# Patient Record
Sex: MALE | Race: WHITE
[De-identification: names, ages, dates, MRNs, and addresses within clinical notes are randomized per-mention and may not be internally consistent; named-entity substitution may affect disease eponyms.]

---

## 2020-02-06 ENCOUNTER — RX ONLY (OUTPATIENT)
Age: 60
Setting detail: RX ONLY
End: 2020-02-06

## 2020-02-06 ENCOUNTER — APPOINTMENT (RX ONLY)
Dept: URBAN - METROPOLITAN AREA CLINIC 32 | Facility: CLINIC | Age: 60
Setting detail: DERMATOLOGY
End: 2020-02-06

## 2020-02-06 VITALS — DIASTOLIC BLOOD PRESSURE: 64 MMHG | SYSTOLIC BLOOD PRESSURE: 112 MMHG

## 2020-02-06 PROBLEM — C44.41 BASAL CELL CARCINOMA OF SKIN OF SCALP AND NECK: Status: ACTIVE | Noted: 2020-02-06

## 2020-02-06 PROCEDURE — 13121 CMPLX RPR S/A/L 2.6-7.5 CM: CPT

## 2020-02-06 PROCEDURE — ? MOHS SURGERY

## 2020-02-06 PROCEDURE — 17311 MOHS 1 STAGE H/N/HF/G: CPT

## 2020-02-06 RX ORDER — MUPIROCIN 20 MG/G
OINTMENT TOPICAL
Qty: 1 | Refills: 6 | Status: ERX | COMMUNITY
Start: 2020-02-06

## 2020-02-06 NOTE — PROCEDURE: MOHS SURGERY
Show Mohs Rapid Report Variable In The Stage Tabs (Ensure You Have This How You Like Before You Hide): Yes
Manual Repair Warning Statement: We plan on removing the manually selected variable below in favor of our much easier automatic structured text blocks found in the previous tab. We decided to do this to help make the flow better and give you the full power of structured data. Manual selection is never going to be ideal in our platform and I would encourage you to avoid using manual selection from this point on, especially since I will be sunsetting this feature. It is important that you do one of two things with the customized text below. First, you can save all of the text in a word file so you can have it for future reference. Second, transfer the text to the appropriate area in the Library tab. Lastly, if there is a flap or graft type which we do not have you need to let us know right away so I can add it in before the variable is hidden. No need to panic, we plan to give you roughly 6 months to make the change.
Same Histology In Subsequent Stages Text: The pattern and morphology of the tumor is as described in the first stage.
Quadrant Reporting?: no
Xenograft Text: The defect edges were debeveled with a #15 scalpel blade.  Given the location of the defect, shape of the defect and the proximity to free margins a xenograft was deemed most appropriate.  The graft was then trimmed to fit the size of the defect.  The graft was then placed in the primary defect and oriented appropriately.
Scc Moderately Differentiated Histology Text: Sections show moderately-differentiated keratincoytes with glassy eosinophilic cytoplasm, invading the dermis.
Double O-Z Flap Text: The defect edges were debeveled with a #15 scalpel blade.  Given the location of the defect, shape of the defect and the proximity to free margins a Double O-Z flap was deemed most appropriate.  Using a sterile surgical marker, an appropriate transposition flap was drawn incorporating the defect and placing the expected incisions within the relaxed skin tension lines where possible. The area thus outlined was incised deep to adipose tissue with a #15 scalpel blade.  The skin margins were undermined to an appropriate distance in all directions utilizing iris scissors.
Post-Care Instructions: I reviewed with the patient in detail post-care instructions. Patient is not to engage in any heavy lifting, exercise, or swimming for the next 14 days. Should the patient develop any fevers, chills, bleeding, severe pain patient will contact the office immediately.
Where Do You Want The Question To Include Opioid Counseling Located?: Case Summary Tab
Referred To Asc For Closure Text (Leave Blank If You Do Not Want): After obtaining clear surgical margins the patient was sent to an ASC for surgical repair.  The patient understands they will receive post-surgical care and follow-up from the ASC physician.
Partial Purse String (Intermediate) Text: Given the location of the defect and the characteristics of the surrounding skin an intermediate purse string closure was deemed most appropriate.  Undermining was performed circumfirentially around the surgical defect.  A purse string suture was then placed and tightened. Wound tension only allowed a partial closure of the circular defect.
Retention Suture Text: Retention sutures were placed to support the closure and prevent dehiscence.
Consent 2/Introductory Paragraph: Mohs surgery was explained to the patient and consent was obtained. The risks, benefits and alternatives to therapy were discussed in detail. Specifically, the risks of infection, scarring, bleeding, prolonged wound healing, incomplete removal, allergy to anesthesia, nerve injury and recurrence were addressed. Prior to the procedure, the treatment site was clearly identified and confirmed by the patient. All components of Universal Protocol/PAUSE Rule completed.
Mauc Override (Will Disregard Factors Selected In Indications Tab): 9 (Appropriate)
Closure 3 Information: This tab is for additional flaps and grafts above and beyond our usual structured repairs.  Please note if you enter information here it will not currently bill and you will need to add the billing information manually.
Spiral Flap Text: The defect edges were debeveled with a #15 scalpel blade.  Given the location of the defect, shape of the defect and the proximity to free margins a spiral flap was deemed most appropriate.  Using a sterile surgical marker, an appropriate rotation flap was drawn incorporating the defect and placing the expected incisions within the relaxed skin tension lines where possible. The area thus outlined was incised deep to adipose tissue with a #15 scalpel blade.  The skin margins were undermined to an appropriate distance in all directions utilizing iris scissors.
Information: Selecting Yes will display possible errors in your note based on the variables you have selected. This validation is only offered as a suggestion for you. PLEASE NOTE THAT THE VALIDATION TEXT WILL BE REMOVED WHEN YOU FINALIZE YOUR NOTE. IF YOU WANT TO FAX A PRELIMINARY NOTE YOU WILL NEED TO TOGGLE THIS TO 'NO' IF YOU DO NOT WANT IT IN YOUR FAXED NOTE.
Stage 15: Additional Anesthesia Type: 1% lidocaine with epinephrine
Secondary Intention Text (Leave Blank If You Do Not Want): The defect will heal with secondary intention.
Scc In Situ Histology Text: SQUAMOUS CELL CARCINOMA IN SITU(SCCIS TYPE 1): Within the epidermis is a full-thickness prolifiration of atypical keratoinocytes with mitoses.The overlying stratum corneum demonstrates parakeratosis. No invasion is noted.
Repair Hemostasis (Optional): Electrodesiccation
Modified Advancement Flap Text: The defect edges were debeveled with a #15 scalpel blade.  Given the location of the defect, shape of the defect and the proximity to free margins a modified advancement flap was deemed most appropriate.  Using a sterile surgical marker, an appropriate advancement flap was drawn incorporating the defect and placing the expected incisions within the relaxed skin tension lines where possible.    The area thus outlined was incised deep to adipose tissue with a #15 scalpel blade.  The skin margins were undermined to an appropriate distance in all directions utilizing iris scissors.
Mid-Level Procedure Text (E): After obtaining clear surgical margins the patient was sent to a mid-level provider for surgical repair.  The patient understands they will receive post-surgical care and follow-up from the mid-level provider.
Incidental Actinic Keratosis Histology Text: Incidentally there are presence of basilar crowding of keratincytes with overlying parakaratosis. There is no evidence of full-thickness atypia.
Stage 3: Additional Anesthesia Volume In Cc: 0
Medical Necessity Statement: Based on my medical judgement, Mohs surgery is the most appropriate treatment for this cancer compared to other treatments.
Deep Sutures: 4-0 Vicryl
Detail Level: Detailed
Helical Rim Text: The closure involved the helical rim.
Donor Site Anesthesia Type: same as repair anesthesia
Stage 1: Number Of Blocks?: 2
Muscle Hinge Flap Text: The defect edges were debeveled with a #15 scalpel blade.  Given the size, depth and location of the defect and the proximity to free margins a muscle hinge flap was deemed most appropriate.  Using a sterile surgical marker, an appropriate hinge flap was drawn incorporating the defect. The area thus outlined was incised with a #15 scalpel blade.  The skin margins were undermined to an appropriate distance in all directions utilizing iris scissors.
Wound Care: Mupirocin
Double M-Plasty Intermediate Repair Preamble Text (Leave Blank If You Do Not Want): Undermining was performed with blunt dissection.
Home Suture Removal Text: Patient was provided instructions on removing sutures and will remove their sutures at home.  If they have any questions or difficulties they will call the office.
Crescentic Advancement Flap Text: The defect edges were debeveled with a #15 scalpel blade.  Given the location of the defect and the proximity to free margins a crescentic advancement flap was deemed most appropriate.  Using a sterile surgical marker, the appropriate advancement flap was drawn incorporating the defect and placing the expected incisions within the relaxed skin tension lines where possible.    The area thus outlined was incised deep to adipose tissue with a #15 scalpel blade.  The skin margins were undermined to an appropriate distance in all directions utilizing iris scissors.
Skin Substitute Text: The defect edges were debeveled with a #15 scalpel blade.  Given the location of the defect, shape of the defect and the proximity to free margins a skin substitute graft was deemed most appropriate.  The graft material was trimmed to fit the size of the defect. The graft was then placed in the primary defect and oriented appropriately.
Incidental Superficial Basal Cell Carcinoma Histology Text: Incidentally there are multifocal nests of atypical basaloid cells budding from the basal layer of the epidermis, with evidence of clefting, consistent with incidental superficial basal cell carcinoma.
Plastic Surgeon Procedure Text (E): After obtaining clear surgical margins the patient was sent to plastics for surgical repair.  The patient understands they will receive post-surgical care and follow-up from the referring physician's office.
Unna Boot Text: An Unna boot was placed to help immobilize the limb and facilitate more rapid healing.
Trilobed Flap Text: The defect edges were debeveled with a #15 scalpel blade.  Given the location of the defect and the proximity to free margins a trilobed flap was deemed most appropriate.  Using a sterile surgical marker, an appropriate trilobed flap drawn around the defect.    The area thus outlined was incised deep to adipose tissue with a #15 scalpel blade.  The skin margins were undermined to an appropriate distance in all directions utilizing iris scissors.
Retention Suture Bite Size: 3 mm
Provider Procedure Text (D): After obtaining clear surgical margins the defect was repaired by another provider.
Banner Transposition Flap Text: The defect edges were debeveled with a #15 scalpel blade.  Given the location of the defect and the proximity to free margins a Banner transposition flap was deemed most appropriate.  Using a sterile surgical marker, an appropriate flap drawn around the defect. The area thus outlined was incised deep to adipose tissue with a #15 scalpel blade.  The skin margins were undermined to an appropriate distance in all directions utilizing iris scissors.
Primary Defect Width In Cm (Final Defect Size - Required For Flaps/Grafts): 1.2
Previous Accession (Optional): FL 
Transposition Flap Text: The defect edges were debeveled with a #15 scalpel blade.  Given the location of the defect and the proximity to free margins a transposition flap was deemed most appropriate.  Using a sterile surgical marker, an appropriate transposition flap was drawn incorporating the defect.    The area thus outlined was incised deep to adipose tissue with a #15 scalpel blade.  The skin margins were undermined to an appropriate distance in all directions utilizing iris scissors.
Location Indication Override (Is Already Calculated Based On Selected Body Location): Area M
Interpolation Flap Text: A decision was made to reconstruct the defect utilizing an interpolation axial flap and a staged reconstruction.  A telfa template was made of the defect.  This telfa template was then used to outline the interpolation flap.  The donor area for the pedicle flap was then injected with anesthesia.  The flap was excised through the skin and subcutaneous tissue down to the layer of the underlying musculature.  The interpolation flap was carefully excised within this deep plane to maintain its blood supply.  The edges of the donor site were undermined.   The donor site was closed in a primary fashion.  The pedicle was then rotated into position and sutured.  Once the tube was sutured into place, adequate blood supply was confirmed with blanching and refill.  The pedicle was then wrapped with xeroform gauze and dressed appropriately with a telfa and gauze bandage to ensure continued blood supply and protect the attached pedicle.
V-Y Plasty Text: The defect edges were debeveled with a #15 scalpel blade.  Given the location of the defect, shape of the defect and the proximity to free margins an V-Y advancement flap was deemed most appropriate.  Using a sterile surgical marker, an appropriate advancement flap was drawn incorporating the defect and placing the expected incisions within the relaxed skin tension lines where possible.    The area thus outlined was incised deep to adipose tissue with a #15 scalpel blade.  The skin margins were undermined to an appropriate distance in all directions utilizing iris scissors.
Double O-Z Plasty Text: The defect edges were debeveled with a #15 scalpel blade.  Given the location of the defect, shape of the defect and the proximity to free margins a Double O-Z plasty (double transposition flap) was deemed most appropriate.  Using a sterile surgical marker, the appropriate transposition flaps were drawn incorporating the defect and placing the expected incisions within the relaxed skin tension lines where possible. The area thus outlined was incised deep to adipose tissue with a #15 scalpel blade.  The skin margins were undermined to an appropriate distance in all directions utilizing iris scissors.  Hemostasis was achieved with electrocautery.  The flaps were then transposed into place, one clockwise and the other counterclockwise, and anchored with interrupted buried subcutaneous sutures.
Additional Anesthesia Volume In Cc: 6
Double Island Pedicle Flap Text: The defect edges were debeveled with a #15 scalpel blade.  Given the location of the defect, shape of the defect and the proximity to free margins a double island pedicle advancement flap was deemed most appropriate.  Using a sterile surgical marker, an appropriate advancement flap was drawn incorporating the defect, outlining the appropriate donor tissue and placing the expected incisions within the relaxed skin tension lines where possible.    The area thus outlined was incised deep to adipose tissue with a #15 scalpel blade.  The skin margins were undermined to an appropriate distance in all directions around the primary defect and laterally outward around the island pedicle utilizing iris scissors.  There was minimal undermining beneath the pedicle flap.
A-T Advancement Flap Text: The defect edges were debeveled with a #15 scalpel blade.  Given the location of the defect, shape of the defect and the proximity to free margins an A-T advancement flap was deemed most appropriate.  Using a sterile surgical marker, an appropriate advancement flap was drawn incorporating the defect and placing the expected incisions within the relaxed skin tension lines where possible.    The area thus outlined was incised deep to adipose tissue with a #15 scalpel blade.  The skin margins were undermined to an appropriate distance in all directions utilizing iris scissors.
Epidermal Closure: subcuticular
Rhomboid Transposition Flap Text: The defect edges were debeveled with a #15 scalpel blade.  Given the location of the defect and the proximity to free margins a rhomboid transposition flap was deemed most appropriate.  Using a sterile surgical marker, an appropriate rhomboid flap was drawn incorporating the defect.    The area thus outlined was incised deep to adipose tissue with a #15 scalpel blade.  The skin margins were undermined to an appropriate distance in all directions utilizing iris scissors.
Debridement Text: The wound edges were debrided prior to proceeding with the closure to facilitate wound healing.
Anesthesia Volume In Cc: 5
Oculoplastic Surgeon Procedure Text (E): After obtaining clear surgical margins the patient was sent to oculoplastics for surgical repair.  The patient understands they will receive post-surgical care and follow-up from the referring physician's office.
Star Wedge Flap Text: The defect edges were debeveled with a #15 scalpel blade.  Given the location of the defect, shape of the defect and the proximity to free margins a star wedge flap was deemed most appropriate.  Using a sterile surgical marker, an appropriate rotation flap was drawn incorporating the defect and placing the expected incisions within the relaxed skin tension lines where possible. The area thus outlined was incised deep to adipose tissue with a #15 scalpel blade.  The skin margins were undermined to an appropriate distance in all directions utilizing iris scissors.
Graft Donor Site Bandage (Optional-Leave Blank If You Don't Want In Note): Steri-strips and a pressure bandage were applied to the donor site.
Consent (Ear)/Introductory Paragraph: The rationale for Mohs was explained to the patient and consent was obtained. The risks, benefits and alternatives to therapy were discussed in detail. Specifically, the risks of ear deformity, infection, scarring, bleeding, prolonged wound healing, incomplete removal, allergy to anesthesia, nerve injury and recurrence were addressed. Prior to the procedure, the treatment site was clearly identified and confirmed by the patient. All components of Universal Protocol/PAUSE Rule completed.
Width Of Defect Perpendicular To Closure In Cm (Required): 1
Bilateral Helical Rim Advancement Flap Text: The defect edges were debeveled with a #15 blade scalpel.  Given the location of the defect and the proximity to free margins (helical rim) a bilateral helical rim advancement flap was deemed most appropriate.  Using a sterile surgical marker, the appropriate advancement flaps were drawn incorporating the defect and placing the expected incisions between the helical rim and antihelix where possible.  The area thus outlined was incised through and through with a #15 scalpel blade.  With a skin hook and iris scissors, the flaps were gently and sharply undermined and freed up.
Z Plasty Text: The lesion was extirpated to the level of the fat with a #15 scalpel blade.  Given the location of the defect, shape of the defect and the proximity to free margins a Z-plasty was deemed most appropriate for repair.  Using a sterile surgical marker, the appropriate transposition arms of the Z-plasty were drawn incorporating the defect and placing the expected incisions within the relaxed skin tension lines where possible.    The area thus outlined was incised deep to adipose tissue with a #15 scalpel blade.  The skin margins were undermined to an appropriate distance in all directions utilizing iris scissors.  The opposing transposition arms were then transposed into place in opposite direction and anchored with interrupted buried subcutaneous sutures.
Paramedian Forehead Flap Text: A decision was made to reconstruct the defect utilizing an interpolation axial flap and a staged reconstruction.  A telfa template was made of the defect.  This telfa template was then used to outline the paramedian forehead pedicle flap.  The donor area for the pedicle flap was then injected with anesthesia.  The flap was excised through the skin and subcutaneous tissue down to the layer of the underlying musculature.  The pedicle flap was carefully excised within this deep plane to maintain its blood supply.  The edges of the donor site were undermined.   The donor site was closed in a primary fashion.  The pedicle was then rotated into position and sutured.  Once the tube was sutured into place, adequate blood supply was confirmed with blanching and refill.  The pedicle was then wrapped with xeroform gauze and dressed appropriately with a telfa and gauze bandage to ensure continued blood supply and protect the attached pedicle.
Consent (Temporal Branch)/Introductory Paragraph: The rationale for Mohs was explained to the patient and consent was obtained. The risks, benefits and alternatives to therapy were discussed in detail. Specifically, the risks of damage to the temporal branch of the facial nerve, infection, scarring, bleeding, prolonged wound healing, incomplete removal, allergy to anesthesia, and recurrence were addressed. Prior to the procedure, the treatment site was clearly identified and confirmed by the patient. All components of Universal Protocol/PAUSE Rule completed.
Consent (Spinal Accessory)/Introductory Paragraph: The rationale for Mohs was explained to the patient and consent was obtained. The risks, benefits and alternatives to therapy were discussed in detail. Specifically, the risks of damage to the spinal accessory nerve, infection, scarring, bleeding, prolonged wound healing, incomplete removal, allergy to anesthesia, and recurrence were addressed. Prior to the procedure, the treatment site was clearly identified and confirmed by the patient. All components of Universal Protocol/PAUSE Rule completed.
Split-Thickness Skin Graft Text: The defect edges were debeveled with a #15 scalpel blade.  Given the location of the defect, shape of the defect and the proximity to free margins a split thickness skin graft was deemed most appropriate.  Using a sterile surgical marker, the primary defect shape was transferred to the donor site. The split thickness graft was then harvested.  The skin graft was then placed in the primary defect and oriented appropriately.
Bcc Macronodular Histology Text: Large basaloid islands with peripheral pallisading with clefting from the stroma.
Crescentic Complex Repair Preamble Text (Leave Blank If You Do Not Want): Extensive wide undermining was performed.
W Plasty Text: The lesion was extirpated to the level of the fat with a #15 scalpel blade.  Given the location of the defect, shape of the defect and the proximity to free margins a W-plasty was deemed most appropriate for repair.  Using a sterile surgical marker, the appropriate transposition arms of the W-plasty were drawn incorporating the defect and placing the expected incisions within the relaxed skin tension lines where possible.    The area thus outlined was incised deep to adipose tissue with a #15 scalpel blade.  The skin margins were undermined to an appropriate distance in all directions utilizing iris scissors.  The opposing transposition arms were then transposed into place in opposite direction and anchored with interrupted buried subcutaneous sutures.
Surgeon Performing Repair (Optional): Dr. German Drummond
Localized Dermabrasion With Wire Brush Text: The patient was draped in routine manner.  Localized dermabrasion using 3 x 17 mm wire brush was performed in routine manner to papillary dermis. This spot dermabrasion is being performed to complete skin cancer reconstruction. It also will eliminate the other sun damaged precancerous cells that are known to be part of the regional effect of a lifetime's worth of sun exposure. This localized dermabrasion is therapeutic and should not be considered cosmetic in any regard.
Scc Acantholytic Histology Text: Sections show atypical keratinocytes with glassy eosinophilic cytoplasm and acantholysis, invading the dermis.
Bi-Rhombic Flap Text: The defect edges were debeveled with a #15 scalpel blade.  Given the location of the defect and the proximity to free margins a bi-rhombic flap was deemed most appropriate.  Using a sterile surgical marker, an appropriate rhombic flap was drawn incorporating the defect. The area thus outlined was incised deep to adipose tissue with a #15 scalpel blade.  The skin margins were undermined to an appropriate distance in all directions utilizing iris scissors.
Otolaryngologist Procedure Text (F): After obtaining clear surgical margins the patient was sent to otolaryngology for surgical repair.  The patient understands they will receive post-surgical care and follow-up from the referring physician's office.
Consent (Near Eyelid Margin)/Introductory Paragraph: The rationale for Mohs was explained to the patient and consent was obtained. The risks, benefits and alternatives to therapy were discussed in detail. Specifically, the risks of ectropion or eyelid deformity, infection, scarring, bleeding, prolonged wound healing, incomplete removal, allergy to anesthesia, nerve injury and recurrence were addressed. Prior to the procedure, the treatment site was clearly identified and confirmed by the patient. All components of Universal Protocol/PAUSE Rule completed.
No Repair - Repaired With Adjacent Surgical Defect Text (Leave Blank If You Do Not Want): After obtaining clear surgical margins the defect was repaired concurrently with another surgical defect which was in close approximation.
Mastoid Interpolation Flap Text: A decision was made to reconstruct the defect utilizing an interpolation axial flap and a staged reconstruction.  A telfa template was made of the defect.  This telfa template was then used to outline the mastoid interpolation flap.  The donor area for the pedicle flap was then injected with anesthesia.  The flap was excised through the skin and subcutaneous tissue down to the layer of the underlying musculature.  The pedicle flap was carefully excised within this deep plane to maintain its blood supply.  The edges of the donor site were undermined.   The donor site was closed in a primary fashion.  The pedicle was then rotated into position and sutured.  Once the tube was sutured into place, adequate blood supply was confirmed with blanching and refill.  The pedicle was then wrapped with xeroform gauze and dressed appropriately with a telfa and gauze bandage to ensure continued blood supply and protect the attached pedicle.
Bcc Histology Text: Basaloid islands with peripheral pallisading forming with clefting from the stroma.
Burow's Advancement Flap Text: The defect edges were debeveled with a #15 scalpel blade.  Given the location of the defect and the proximity to free margins a Burow's advancement flap was deemed most appropriate.  Using a sterile surgical marker, the appropriate advancement flap was drawn incorporating the defect and placing the expected incisions within the relaxed skin tension lines where possible.    The area thus outlined was incised deep to adipose tissue with a #15 scalpel blade.  The skin margins were undermined to an appropriate distance in all directions utilizing iris scissors.
Epidermal Sutures: 4-0 Prolene
Repair Type: Complex Repair
Mauc Instructions: By selecting yes to the question below the MAUC number will be added into the note.  This will be calculated automatically based on the diagnosis chosen, the size entered, the body zone selected (H,M,L) and the specific indications you chose. You will also have the option to override the Mohs AUC if you disagree with the automatically calculated number and this option is found in the Case Summary tab.
Full Thickness Lip Wedge Repair (Flap) Text: Given the location of the defect and the proximity to free margins a full thickness wedge repair was deemed most appropriate.  Using a sterile surgical marker, the appropriate repair was drawn incorporating the defect and placing the expected incisions perpendicular to the vermilion border.  The vermilion border was also meticulously outlined to ensure appropriate reapproximation during the repair.  The area thus outlined was incised through and through with a #15 scalpel blade.  The muscularis and dermis were reaproximated with deep sutures following hemostasis. Care was taken to realign the vermilion border before proceeding with the superficial closure.  Once the vermilion was realigned the superfical and mucosal closure was finished.
O-T Advancement Flap Text: The defect edges were debeveled with a #15 scalpel blade.  Given the location of the defect, shape of the defect and the proximity to free margins an O-T advancement flap was deemed most appropriate.  Using a sterile surgical marker, an appropriate advancement flap was drawn incorporating the defect and placing the expected incisions within the relaxed skin tension lines where possible.    The area thus outlined was incised deep to adipose tissue with a #15 scalpel blade.  The skin margins were undermined to an appropriate distance in all directions utilizing iris scissors.
Ftsg Text: The defect edges were debeveled with a #15 scalpel blade.  Given the location of the defect, shape of the defect and the proximity to free margins a full thickness skin graft was deemed most appropriate.  Using a sterile surgical marker, the primary defect shape was transferred to the donor site. The area thus outlined was incised deep to adipose tissue with a #15 scalpel blade.  The harvested graft was then trimmed of adipose tissue until only dermis and epidermis was left.  The skin margins of the secondary defect were undermined to an appropriate distance in all directions utilizing iris scissors.  The secondary defect was closed with interrupted buried subcutaneous sutures.  The skin edges were then re-apposed with running  sutures.  The skin graft was then placed in the primary defect and oriented appropriately.
Mohs Histo Method Verbiage: Each section was then chromacoded and processed in the Mohs lab using the Mohs protocol and submitted for frozen section.
Melolabial Transposition Flap Text: The defect edges were debeveled with a #15 scalpel blade.  Given the location of the defect and the proximity to free margins a melolabial flap was deemed most appropriate.  Using a sterile surgical marker, an appropriate melolabial transposition flap was drawn incorporating the defect.    The area thus outlined was incised deep to adipose tissue with a #15 scalpel blade.  The skin margins were undermined to an appropriate distance in all directions utilizing iris scissors.
Consent (Nose)/Introductory Paragraph: The rationale for Mohs was explained to the patient and consent was obtained. The risks, benefits and alternatives to therapy were discussed in detail. Specifically, the risks of nasal deformity, changes in the flow of air through the nose, infection, scarring, bleeding, prolonged wound healing, incomplete removal, allergy to anesthesia, nerve injury and recurrence were addressed. Prior to the procedure, the treatment site was clearly identified and confirmed by the patient. All components of Universal Protocol/PAUSE Rule completed.
Ear Star Wedge Flap Text: The defect edges were debeveled with a #15 blade scalpel.  Given the location of the defect and the proximity to free margins (helical rim) an ear star wedge flap was deemed most appropriate.  Using a sterile surgical marker, the appropriate flap was drawn incorporating the defect and placing the expected incisions between the helical rim and antihelix where possible.  The area thus outlined was incised through and through with a #15 scalpel blade.
Tissue Cultured Epidermal Autograft Text: The defect edges were debeveled with a #15 scalpel blade.  Given the location of the defect, shape of the defect and the proximity to free margins a tissue cultured epidermal autograft was deemed most appropriate.  The graft was then trimmed to fit the size of the defect.  The graft was then placed in the primary defect and oriented appropriately.
Initial Size Of Lesion: 0.9
Vermilion Border Text: The closure involved the vermilion border.
Consent (Lip)/Introductory Paragraph: The rationale for Mohs was explained to the patient and consent was obtained. The risks, benefits and alternatives to therapy were discussed in detail. Specifically, the risks of lip deformity, changes in the oral aperture, infection, scarring, bleeding, prolonged wound healing, incomplete removal, allergy to anesthesia, nerve injury and recurrence were addressed. Prior to the procedure, the treatment site was clearly identified and confirmed by the patient. All components of Universal Protocol/PAUSE Rule completed.
Alternatives Discussed Intro (Do Not Add Period): I discussed alternative treatments to Mohs surgery and specifically discussed the risks and benefits of
Mucosal Advancement Flap Text: Given the location of the defect, shape of the defect and the proximity to free margins a mucosal advancement flap was deemed most appropriate. Incisions were made with a 15 blade scalpel in the appropriate fashion along the cutaneous vermilion border and the mucosal lip. The remaining actinically damaged mucosal tissue was excised.  The mucosal advancement flap was then elevated to the gingival sulcus with care taken to preserve the neurovascular structures and advanced into the primary defect. Care was taken to ensure that precise realignment of the vermilion border was achieved.
X Size Of Lesion In Cm (Optional): 0.8
Bcc Infiltrative Histology Text: Irregularly shape cords and strands of basaloid keratincytes infiltrate the dermis with a spiky growth pattern. The cells have scant cytoplasm  and round dark nuclei. Mitotic figures and apoptotic bodies are are evident. The nuclei at the periphery of the islands have a palisaded arrangement. The islands are associated with a fibromyxoid stroma an there is cleft formation between some of the islands and stroma.
Repair Anesthesia Method: local infiltration
Alar Island Pedicle Flap Text: The defect edges were debeveled with a #15 scalpel blade.  Given the location of the defect, shape of the defect and the proximity to the alar rim an island pedicle advancement flap was deemed most appropriate.  Using a sterile surgical marker, an appropriate advancement flap was drawn incorporating the defect, outlining the appropriate donor tissue and placing the expected incisions within the nasal ala running parallel to the alar rim. The area thus outlined was incised with a #15 scalpel blade.  The skin margins were undermined minimally to an appropriate distance in all directions around the primary defect and laterally outward around the island pedicle utilizing iris scissors.  There was minimal undermining beneath the pedicle flap.
Length To Time In Minutes Device Was In Place: 10
Area M Indication Text: Tumors in this location are included in Area M (cheek, forehead, scalp, neck, jawline and pretibial skin).  Mohs surgery is indicated for tumors in these anatomic locations.
Island Pedicle Flap With Canthal Suspension Text: The defect edges were debeveled with a #15 scalpel blade.  Given the location of the defect, shape of the defect and the proximity to free margins an island pedicle advancement flap was deemed most appropriate.  Using a sterile surgical marker, an appropriate advancement flap was drawn incorporating the defect, outlining the appropriate donor tissue and placing the expected incisions within the relaxed skin tension lines where possible. The area thus outlined was incised deep to adipose tissue with a #15 scalpel blade.  The skin margins were undermined to an appropriate distance in all directions around the primary defect and laterally outward around the island pedicle utilizing iris scissors.  There was minimal undermining beneath the pedicle flap. A suspension suture was placed in the canthal tendon to prevent tension and prevent ectropion.
H Plasty Text: Given the location of the defect, shape of the defect and the proximity to free margins a H-plasty was deemed most appropriate for repair.  Using a sterile surgical marker, the appropriate advancement arms of the H-plasty were drawn incorporating the defect and placing the expected incisions within the relaxed skin tension lines where possible. The area thus outlined was incised deep to adipose tissue with a #15 scalpel blade. The skin margins were undermined to an appropriate distance in all directions utilizing iris scissors.  The opposing advancement arms were then advanced into place in opposite direction and anchored with interrupted buried subcutaneous sutures.
Melolabial Interpolation Flap Text: A decision was made to reconstruct the defect utilizing an interpolation axial flap and a staged reconstruction.  A telfa template was made of the defect.  This telfa template was then used to outline the melolabial interpolation flap.  The donor area for the pedicle flap was then injected with anesthesia.  The flap was excised through the skin and subcutaneous tissue down to the layer of the underlying musculature.  The pedicle flap was carefully excised within this deep plane to maintain its blood supply.  The edges of the donor site were undermined.   The donor site was closed in a primary fashion.  The pedicle was then rotated into position and sutured.  Once the tube was sutured into place, adequate blood supply was confirmed with blanching and refill.  The pedicle was then wrapped with xeroform gauze and dressed appropriately with a telfa and gauze bandage to ensure continued blood supply and protect the attached pedicle.
Keystone Flap Text: The defect edges were debeveled with a #15 scalpel blade.  Given the location of the defect, shape of the defect a keystone flap was deemed most appropriate.  Using a sterile surgical marker, an appropriate keystone flap was drawn incorporating the defect, outlining the appropriate donor tissue and placing the expected incisions within the relaxed skin tension lines where possible. The area thus outlined was incised deep to adipose tissue with a #15 scalpel blade.  The skin margins were undermined to an appropriate distance in all directions around the primary defect and laterally outward around the flap utilizing iris scissors.
Advancement-Rotation Flap Text: The defect edges were debeveled with a #15 scalpel blade.  Given the location of the defect, shape of the defect and the proximity to free margins an advancement-rotation flap was deemed most appropriate.  Using a sterile surgical marker, an appropriate flap was drawn incorporating the defect and placing the expected incisions within the relaxed skin tension lines where possible. The area thus outlined was incised deep to adipose tissue with a #15 scalpel blade.  The skin margins were undermined to an appropriate distance in all directions utilizing iris scissors.
Hatchet Flap Text: The defect edges were debeveled with a #15 scalpel blade.  Given the location of the defect, shape of the defect and the proximity to free margins a hatchet flap was deemed most appropriate.  Using a sterile surgical marker, an appropriate hatchet flap was drawn incorporating the defect and placing the expected incisions within the relaxed skin tension lines where possible.    The area thus outlined was incised deep to adipose tissue with a #15 scalpel blade.  The skin margins were undermined to an appropriate distance in all directions utilizing iris scissors.
O-L Flap Text: The defect edges were debeveled with a #15 scalpel blade.  Given the location of the defect, shape of the defect and the proximity to free margins an O-L flap was deemed most appropriate.  Using a sterile surgical marker, an appropriate advancement flap was drawn incorporating the defect and placing the expected incisions within the relaxed skin tension lines where possible.    The area thus outlined was incised deep to adipose tissue with a #15 scalpel blade.  The skin margins were undermined to an appropriate distance in all directions utilizing iris scissors.
Posterior Auricular Interpolation Flap Text: A decision was made to reconstruct the defect utilizing an interpolation axial flap and a staged reconstruction.  A telfa template was made of the defect.  This telfa template was then used to outline the posterior auricular interpolation flap.  The donor area for the pedicle flap was then injected with anesthesia.  The flap was excised through the skin and subcutaneous tissue down to the layer of the underlying musculature.  The pedicle flap was carefully excised within this deep plane to maintain its blood supply.  The edges of the donor site were undermined.   The donor site was closed in a primary fashion.  The pedicle was then rotated into position and sutured.  Once the tube was sutured into place, adequate blood supply was confirmed with blanching and refill.  The pedicle was then wrapped with xeroform gauze and dressed appropriately with a telfa and gauze bandage to ensure continued blood supply and protect the attached pedicle.
Tarsorrhaphy Text: A tarsorrhaphy was performed using Frost sutures.
Mohs Rapid Report Verbiage: The area of clinically evident tumor was marked with skin marking ink and appropriately hatched.  The initial incision was made following the Mohs approach through the skin.  The specimen was taken to the lab, divided into the necessary number of pieces, chromacoded and processed according to the Mohs protocol.  This was repeated in successive stages until a tumor free defect was achieved.
Inflammation Suggestive Of Cancer Camouflage Histology Text: There was a dense lymphocytic infiltrate which prevented adequate histologic evaluation of adjacent structures.
Complex Repair And Graft Additional Text (Will Appearing After The Standard Complex Repair Text): The complex repair was not sufficient to completely close the primary defect. The remaining additional defect was repaired with the graft mentioned below.
Surgeon/Pathologist Verbiage (Will Incorporate Name Of Surgeon From Intro If Not Blank): operated in two distinct and integrated capacities as the surgeon and pathologist.
Island Pedicle Flap-Requiring Vessel Identification Text: The defect edges were debeveled with a #15 scalpel blade.  Given the location of the defect, shape of the defect and the proximity to free margins an island pedicle advancement flap was deemed most appropriate.  Using a sterile surgical marker, an appropriate advancement flap was drawn, based on the axial vessel mentioned above, incorporating the defect, outlining the appropriate donor tissue and placing the expected incisions within the relaxed skin tension lines where possible.    The area thus outlined was incised deep to adipose tissue with a #15 scalpel blade.  The skin margins were undermined to an appropriate distance in all directions around the primary defect and laterally outward around the island pedicle utilizing iris scissors.  There was minimal undermining beneath the pedicle flap.
Bcc Superficial Histology Text: SUPERFICIAL BASAL CELL CARCINOMA(BCC1): Arising from the epidermis and superficial hair follicles are small, multicentric buds of basaloid keratinocytes. The cells have scant cytoplasm and round dark nuclei. Mitotic figures and apoptotic bodies are evident. The nuclei at the periphery of the islands have a palisaded arrangement. The islands are associated with a fibromyxoid stroma there is cleft formation between some of the islands and stroma.
Subsequent Stages Histo Method Verbiage: Using a similar technique to that described above, a thin layer of tissue was removed from all areas where tumor was visible on the previous stage.  The tissue was again oriented, mapped, dyed, and processed as above.
Pain Refusal Text: I offered to prescribe pain medication but the patient refused to take this medication.
Bilobed Transposition Flap Text: The defect edges were debeveled with a #15 scalpel blade.  Given the location of the defect and the proximity to free margins a bilobed transposition flap was deemed most appropriate.  Using a sterile surgical marker, an appropriate bilobe flap drawn around the defect.    The area thus outlined was incised deep to adipose tissue with a #15 scalpel blade.  The skin margins were undermined to an appropriate distance in all directions utilizing iris scissors.
Consent 3/Introductory Paragraph: I gave the patient a chance to ask questions they had about the procedure.  Following this I explained the Mohs procedure and consent was obtained. The risks, benefits and alternatives to therapy were discussed in detail. Specifically, the risks of infection, scarring, bleeding, prolonged wound healing, incomplete removal, allergy to anesthesia, nerve injury and recurrence were addressed. Prior to the procedure, the treatment site was clearly identified and confirmed by the patient. All components of Universal Protocol/PAUSE Rule completed.
Bilobed Flap Text: The defect edges were debeveled with a #15 scalpel blade.  Given the location of the defect and the proximity to free margins a bilobe flap was deemed most appropriate.  Using a sterile surgical marker, an appropriate bilobe flap drawn around the defect.    The area thus outlined was incised deep to adipose tissue with a #15 scalpel blade.  The skin margins were undermined to an appropriate distance in all directions utilizing iris scissors.
Complex Repair And Flap Additional Text (Will Appearing After The Standard Complex Repair Text): The complex repair was not sufficient to completely close the primary defect. The remaining additional defect was repaired with the flap mentioned below.
Partial Purse String (Simple) Text: Given the location of the defect and the characteristics of the surrounding skin a simple purse string closure was deemed most appropriate.  Undermining was performed circumfirentially around the surgical defect.  A purse string suture was then placed and tightened. Wound tension only allowed a partial closure of the circular defect.
Suturegard Retention Suture: 2-0 Nylon
O-Z Flap Text: The defect edges were debeveled with a #15 scalpel blade.  Given the location of the defect, shape of the defect and the proximity to free margins an O-Z flap was deemed most appropriate.  Using a sterile surgical marker, an appropriate transposition flap was drawn incorporating the defect and placing the expected incisions within the relaxed skin tension lines where possible. The area thus outlined was incised deep to adipose tissue with a #15 scalpel blade.  The skin margins were undermined to an appropriate distance in all directions utilizing iris scissors.
Postop Diagnosis: same
Mixed Nodular And Infiltrative Bcc Histology Text: NODULAR BASAL CELL CARCINOMA(BCC2): Emanating from the epidermis and infiltrating the dermis are irregularlyshaped islands of basaloid keratinocytes. The cells have scant cytoplasm and round dark nuclei. Mitotic figures and apoptotic bodies are evident. The nuclei at the periphery of the islands have a palisaded arrangment. The islands are associated with a fibromyxoid stroma and there is cleft formation between some of the islands and stroma.
Consent (Marginal Mandibular)/Introductory Paragraph: The rationale for Mohs was explained to the patient and consent was obtained. The risks, benefits and alternatives to therapy were discussed in detail. Specifically, the risks of damage to the marginal mandibular branch of the facial nerve, infection, scarring, bleeding, prolonged wound healing, incomplete removal, allergy to anesthesia, and recurrence were addressed. Prior to the procedure, the treatment site was clearly identified and confirmed by the patient. All components of Universal Protocol/PAUSE Rule completed.
Cheiloplasty (Less Than 50%) Text: A decision was made to reconstruct the defect with a  cheiloplasty.  The defect was undermined extensively.  Additional obicularis oris muscle was excised with a 15 blade scalpel.  The defect was converted into a full thickness wedge, of less than 50% of the vertical height of the lip, to facilite a better cosmetic result.  Small vessels were then tied off with 5-0 monocyrl. The obicularis oris, superficial fascia, adipose and dermis were then reapproximated.  After the deeper layers were approximated the epidermis was reapproximated with particular care given to realign the vermilion border.
Epidermal Closure Graft Donor Site (Optional): simple interrupted
Purse String (Intermediate) Text: Given the location of the defect and the characteristics of the surrounding skin a purse string intermediate closure was deemed most appropriate.  Undermining was performed circumfirentially around the surgical defect.  A purse string suture was then placed and tightened.
Epidermal Autograft Text: The defect edges were debeveled with a #15 scalpel blade.  Given the location of the defect, shape of the defect and the proximity to free margins an epidermal autograft was deemed most appropriate.  Using a sterile surgical marker, the primary defect shape was transferred to the donor site. The epidermal graft was then harvested.  The skin graft was then placed in the primary defect and oriented appropriately.
Estimated Blood Loss (Cc): minimal
Chonodrocutaneous Helical Advancement Flap Text: The defect edges were debeveled with a #15 scalpel blade.  Given the location of the defect and the proximity to free margins a chondrocutaneous helical advancement flap was deemed most appropriate.  Using a sterile surgical marker, the appropriate advancement flap was drawn incorporating the defect and placing the expected incisions within the relaxed skin tension lines where possible.    The area thus outlined was incised deep to adipose tissue with a #15 scalpel blade.  The skin margins were undermined to an appropriate distance in all directions utilizing iris scissors.
Date Of Previous Biopsy (Optional): 12/17/2019
Consent 1/Introductory Paragraph: The rationale for Mohs was explained to the patient and consent was obtained. The risks, benefits and alternatives to therapy were discussed in detail. Specifically, the risks of infection, scarring, bleeding, prolonged wound healing, incomplete removal, allergy to anesthesia, nerve injury and recurrence were addressed. Prior to the procedure, the treatment site was clearly identified and confirmed by the patient. All components of Universal Protocol/PAUSE Rule completed.
Consent Type: Consent 1 (Standard)
Graft Cartilage Fenestration Text: The cartilage was fenestrated with a 2mm punch biopsy to help facilitate graft survival and healing.
O-T Plasty Text: The defect edges were debeveled with a #15 scalpel blade.  Given the location of the defect, shape of the defect and the proximity to free margins an O-T plasty was deemed most appropriate.  Using a sterile surgical marker, an appropriate O-T plasty was drawn incorporating the defect and placing the expected incisions within the relaxed skin tension lines where possible.    The area thus outlined was incised deep to adipose tissue with a #15 scalpel blade.  The skin margins were undermined to an appropriate distance in all directions utilizing iris scissors.
Advancement Flap (Single) Text: The defect edges were debeveled with a #15 scalpel blade.  Given the location of the defect and the proximity to free margins a single advancement flap was deemed most appropriate.  Using a sterile surgical marker, an appropriate advancement flap was drawn incorporating the defect and placing the expected incisions within the relaxed skin tension lines where possible.    The area thus outlined was incised deep to adipose tissue with a #15 scalpel blade.  The skin margins were undermined to an appropriate distance in all directions utilizing iris scissors.
Dressing (No Sutures): dry sterile dressing
Mercedes Flap Text: The defect edges were debeveled with a #15 scalpel blade.  Given the location of the defect, shape of the defect and the proximity to free margins a Mercedes flap was deemed most appropriate.  Using a sterile surgical marker, an appropriate advancement flap was drawn incorporating the defect and placing the expected incisions within the relaxed skin tension lines where possible. The area thus outlined was incised deep to adipose tissue with a #15 scalpel blade.  The skin margins were undermined to an appropriate distance in all directions utilizing iris scissors.
Suture Removal: 14 days
Scc Well Differentiated Histology Text: WELL DIFFERENTIATED INVASIVE SQUAMOUS CELL SQUAMOUS CELL CARCINOMA(SCC TYPE 2): Arising from the epidermis is a keratin-producing proliferation of atypical keratinocytes with invasion into the underlying dermis. Mitoses and atypical forms are evident. Intercellular bridge and keratin mila formation are evident.
Advancement Flap (Double) Text: The defect edges were debeveled with a #15 scalpel blade.  Given the location of the defect and the proximity to free margins a double advancement flap was deemed most appropriate.  Using a sterile surgical marker, the appropriate advancement flaps were drawn incorporating the defect and placing the expected incisions within the relaxed skin tension lines where possible.    The area thus outlined was incised deep to adipose tissue with a #15 scalpel blade.  The skin margins were undermined to an appropriate distance in all directions utilizing iris scissors.
Primary Defect Length In Cm (Final Defect Size - Required For Flaps/Grafts): 1.9
Dorsal Nasal Flap Text: The defect edges were debeveled with a #15 scalpel blade.  Given the location of the defect and the proximity to free margins a dorsal nasal flap was deemed most appropriate.  Using a sterile surgical marker, an appropriate dorsal nasal flap was drawn around the defect.    The area thus outlined was incised deep to adipose tissue with a #15 scalpel blade.  The skin margins were undermined to an appropriate distance in all directions utilizing iris scissors.
No Residual Tumor Seen Histology Text: There were no malignant cells seen in the sections examined.
S Plasty Text: Given the location and shape of the defect, and the orientation of relaxed skin tension lines, an S-plasty was deemed most appropriate for repair.  Using a sterile surgical marker, the appropriate outline of the S-plasty was drawn, incorporating the defect and placing the expected incisions within the relaxed skin tension lines where possible.  The area thus outlined was incised deep to adipose tissue with a #15 scalpel blade.  The skin margins were undermined to an appropriate distance in all directions utilizing iris scissors. The skin flaps were advanced over the defect.  The opposing margins were then approximated with interrupted buried subcutaneous sutures.
Simple / Intermediate / Complex Repair - Final Wound Length In Cm: 3.2
Composite Graft Text: The defect edges were debeveled with a #15 scalpel blade.  Given the location of the defect, shape of the defect, the proximity to free margins and the fact the defect was full thickness a composite graft was deemed most appropriate.  The defect was outline and then transferred to the donor site.  A full thickness graft was then excised from the donor site. The graft was then placed in the primary defect, oriented appropriately and then sutured into place.  The secondary defect was then repaired using a primary closure.
Eye Protection Verbiage: Before proceeding with the stage, a plastic scleral shield was inserted. The globe was anesthetized with a few drops of 1% lidocaine with 1:100,000 epinephrine. Then, an appropriate sized scleral shield was chosen and coated with lacrilube ointment. The shield was gently inserted and left in place for the duration of each stage. After the stage was completed, the shield was gently removed.
Bcc Micronodular Histology Text: Small basaloid islands with peripheral  pallisading with clefting from the stroma.
Cheiloplasty (Complex) Text: A decision was made to reconstruct the defect with a  cheiloplasty.  The defect was undermined extensively.  Additional obicularis oris muscle was excised with a 15 blade scalpel.  The defect was converted into a full thickness wedge to facilite a better cosmetic result.  Small vessels were then tied off with 5-0 monocyrl. The obicularis oris, superficial fascia, adipose and dermis were then reapproximated.  After the deeper layers were approximated the epidermis was reapproximated with particular care given to realign the vermilion border.
Scc Ka Subtype Histology Text: There is a symmetric, crateriform nodule with a central keratinous core. Buttress formation is noted at the peripheries of the nodule. The keratinocytes are enlarge with glassy, eosinophilic cytoplasm. The atypical keratinocytes infiltrate the dermis in an irregular fashion and associated with inflammation including lymphomononucear cell and eosinophils.
Body Location Override (Optional - Billing Will Still Be Based On Selected Body Map Location If Applicable): Left frontal scalp
Area L Indication Text: Tumors in this location are included in Area L (trunk and extremities).  Mohs surgery is indicated for larger tumors, or tumors with aggressive histologic features, in these anatomic locations.
Purse String (Simple) Text: Given the location of the defect and the characteristics of the surrounding skin a purse string closure was deemed most appropriate.  Undermining was performed circumfirentially around the surgical defect.  A purse string suture was then placed and tightened.
Referring Physician (Optional): Dr. Dorantes
Cheek-To-Nose Interpolation Flap Text: A decision was made to reconstruct the defect utilizing an interpolation axial flap and a staged reconstruction.  A telfa template was made of the defect.  This telfa template was then used to outline the Cheek-To-Nose Interpolation flap.  The donor area for the pedicle flap was then injected with anesthesia.  The flap was excised through the skin and subcutaneous tissue down to the layer of the underlying musculature.  The interpolation flap was carefully excised within this deep plane to maintain its blood supply.  The edges of the donor site were undermined.   The donor site was closed in a primary fashion.  The pedicle was then rotated into position and sutured.  Once the tube was sutured into place, adequate blood supply was confirmed with blanching and refill.  The pedicle was then wrapped with xeroform gauze and dressed appropriately with a telfa and gauze bandage to ensure continued blood supply and protect the attached pedicle.
O-Z Plasty Text: The defect edges were debeveled with a #15 scalpel blade.  Given the location of the defect, shape of the defect and the proximity to free margins an O-Z plasty (double transposition flap) was deemed most appropriate.  Using a sterile surgical marker, the appropriate transposition flaps were drawn incorporating the defect and placing the expected incisions within the relaxed skin tension lines where possible.    The area thus outlined was incised deep to adipose tissue with a #15 scalpel blade.  The skin margins were undermined to an appropriate distance in all directions utilizing iris scissors.  Hemostasis was achieved with electrocautery.  The flaps were then transposed into place, one clockwise and the other counterclockwise, and anchored with interrupted buried subcutaneous sutures.
V-Y Flap Text: The defect edges were debeveled with a #15 scalpel blade.  Given the location of the defect, shape of the defect and the proximity to free margins a V-Y flap was deemed most appropriate.  Using a sterile surgical marker, an appropriate advancement flap was drawn incorporating the defect and placing the expected incisions within the relaxed skin tension lines where possible.    The area thus outlined was incised deep to adipose tissue with a #15 scalpel blade.  The skin margins were undermined to an appropriate distance in all directions utilizing iris scissors.
Non-Graft Cartilage Fenestration Text: The cartilage was fenestrated with a 2mm punch biopsy to help facilitate healing.
Anesthesia Volume In Cc: 8
Cartilage Graft Text: The defect edges were debeveled with a #15 scalpel blade.  Given the location of the defect, shape of the defect, the fact the defect involved a full thickness cartilage defect a cartilage graft was deemed most appropriate.  An appropriate donor site was identified, cleansed, and anesthetized. The cartilage graft was then harvested and transferred to the recipient site, oriented appropriately and then sutured into place.  The secondary defect was then repaired using a primary closure.
Bcc  Morpheaform/Sclerosing Histology Text: Thin infiltrating strands of basaloid cells with sclerotic stroma. In some areas, there are tadpole-like islands.
Ear Wedge Repair Text: A wedge excision was completed by carrying down an excision through the full thickness of the ear and cartilage with an inward facing Burow's triangle. The wound was then closed in a layered fashion.
Suturegard Intro: Intraoperative tissue expansion was performed, utilizing the SUTUREGARD device, in order to reduce wound tension.
Scc Poorly Differentiated Histology Text: Arising from the epidermis is a pleomorphic proliferation of atypical keratinocytes with mitose. Invasion into the dermis is noted.
Incidental Squamous Cell Carcinoma In Situ Histology Text: Incidentally, there are full-thickness glassy keratinocyte atypia a wind-blown arrangement within the epidermis, consistent with incidental squamous cell carcinoma in-situ.
Area H Indication Text: Tumors in this location are included in Area H (eyelids, eyebrows, nose, lips, chin, ear, pre-auricular, post-auricular, temple, genitalia, hands, feet, ankles and areola).  Tissue conservation is critical in these anatomic locations.
Bcc Infundibulocystic Histology Text: Sections show multiple pink strands of squamous epithelium and blue basaloid buds at the tips of the strands with some horn cysts.
Suturegard Body: The suture ends were repeatedly re-tightened and re-clamped to achieve the desired tissue expansion.
Dermal Autograft Text: The defect edges were debeveled with a #15 scalpel blade.  Given the location of the defect, shape of the defect and the proximity to free margins a dermal autograft was deemed most appropriate.  Using a sterile surgical marker, the primary defect shape was transferred to the donor site. The area thus outlined was incised deep to adipose tissue with a #15 scalpel blade.  The harvested graft was then trimmed of adipose and epidermal tissue until only dermis was left.  The skin graft was then placed in the primary defect and oriented appropriately.
Consent (Scalp)/Introductory Paragraph: The rationale for Mohs was explained to the patient and consent was obtained. The risks, benefits and alternatives to therapy were discussed in detail. Specifically, the risks of changes in hair growth pattern secondary to repair, infection, scarring, bleeding, prolonged wound healing, incomplete removal, allergy to anesthesia, nerve injury and recurrence were addressed. Prior to the procedure, the treatment site was clearly identified and confirmed by the patient. All components of Universal Protocol/PAUSE Rule completed.
Helical Rim Advancement Flap Text: The defect edges were debeveled with a #15 blade scalpel.  Given the location of the defect and the proximity to free margins (helical rim) a double helical rim advancement flap was deemed most appropriate.  Using a sterile surgical marker, the appropriate advancement flaps were drawn incorporating the defect and placing the expected incisions between the helical rim and antihelix where possible.  The area thus outlined was incised through and through with a #15 scalpel blade.  With a skin hook and iris scissors, the flaps were gently and sharply undermined and freed up.
Nostril Rim Text: The closure involved the nostril rim.
Bcc  Nodular Histology Text: Basaloid islands with peripheral pallisading forming with cleft from the stroma.
Rhombic Flap Text: The defect edges were debeveled with a #15 scalpel blade.  Given the location of the defect and the proximity to free margins a rhombic flap was deemed most appropriate.  Using a sterile surgical marker, an appropriate rhombic flap was drawn incorporating the defect.    The area thus outlined was incised deep to adipose tissue with a #15 scalpel blade.  The skin margins were undermined to an appropriate distance in all directions utilizing iris scissors.
Mohs Method Verbiage: An incision at a 45 degree angle following the standard Mohs approach was done and the specimen was harvested as a microscopic controlled layer.
Lentigo Maligna Melanoma Histology Text: There is an intraepidermal melanocytic proliferation arranged as nest as well as indiviual cells. The individual cells along the dermal-epidermal junction with well-developed confluent growth. Pagetoid migration to the upper levels of epidermis is noted. The individual melanocytes are enlarged and hyperchromatic with irregular nuclear and coarse chromatin. Within the dermis there are rare nests of similar appearing melanocytes that are associated with fibroplasia, mild chronic inflammation and solar elastosis.
Rotation Flap Text: The defect edges were debeveled with a #15 scalpel blade.  Given the location of the defect, shape of the defect and the proximity to free margins a rotation flap was deemed most appropriate.  Using a sterile surgical marker, an appropriate rotation flap was drawn incorporating the defect and placing the expected incisions within the relaxed skin tension lines where possible.    The area thus outlined was incised deep to adipose tissue with a #15 scalpel blade.  The skin margins were undermined to an appropriate distance in all directions utilizing iris scissors.
Cheek Interpolation Flap Text: A decision was made to reconstruct the defect utilizing an interpolation axial flap and a staged reconstruction.  A telfa template was made of the defect.  This telfa template was then used to outline the Cheek Interpolation flap.  The donor area for the pedicle flap was then injected with anesthesia.  The flap was excised through the skin and subcutaneous tissue down to the layer of the underlying musculature.  The interpolation flap was carefully excised within this deep plane to maintain its blood supply.  The edges of the donor site were undermined.   The donor site was closed in a primary fashion.  The pedicle was then rotated into position and sutured.  Once the tube was sutured into place, adequate blood supply was confirmed with blanching and refill.  The pedicle was then wrapped with xeroform gauze and dressed appropriately with a telfa and gauze bandage to ensure continued blood supply and protect the attached pedicle.
Island Pedicle Flap Text: The defect edges were debeveled with a #15 scalpel blade.  Given the location of the defect, shape of the defect and the proximity to free margins an island pedicle advancement flap was deemed most appropriate.  Using a sterile surgical marker, an appropriate advancement flap was drawn incorporating the defect, outlining the appropriate donor tissue and placing the expected incisions within the relaxed skin tension lines where possible.    The area thus outlined was incised deep to adipose tissue with a #15 scalpel blade.  The skin margins were undermined to an appropriate distance in all directions around the primary defect and laterally outward around the island pedicle utilizing iris scissors.  There was minimal undermining beneath the pedicle flap.
Mohs Case Number: [id number]
Undermining Type: Entire Wound

## 2020-02-06 NOTE — HPI: PROCEDURE (MOHS)
Has The Growth Been Previously Biopsied?: has been previously biopsied
Body Location Override (Optional): left frontal scalp

## 2020-02-25 ENCOUNTER — APPOINTMENT (RX ONLY)
Dept: URBAN - METROPOLITAN AREA CLINIC 32 | Facility: CLINIC | Age: 60
Setting detail: DERMATOLOGY
End: 2020-02-25

## 2020-02-25 DIAGNOSIS — Z48.02 ENCOUNTER FOR REMOVAL OF SUTURES: ICD-10-CM

## 2020-02-25 PROCEDURE — ? SUTURE REMOVAL (GLOBAL PERIOD)

## 2020-02-25 PROCEDURE — 99024 POSTOP FOLLOW-UP VISIT: CPT

## 2020-02-25 ASSESSMENT — LOCATION DETAILED DESCRIPTION DERM: LOCATION DETAILED: LEFT SUPERIOR FOREHEAD

## 2020-02-25 ASSESSMENT — LOCATION SIMPLE DESCRIPTION DERM: LOCATION SIMPLE: LEFT FOREHEAD

## 2020-02-25 ASSESSMENT — LOCATION ZONE DERM: LOCATION ZONE: FACE

## 2020-02-25 NOTE — PROCEDURE: SUTURE REMOVAL (GLOBAL PERIOD)
Add 93172 Cpt? (Important Note: In 2017 The Use Of 62323 Is Being Tracked By Cms To Determine Future Global Period Reimbursement For Global Periods): yes
Detail Level: Detailed

## 2020-08-28 ENCOUNTER — ERX REFILL RESPONSE (OUTPATIENT)
Age: 60
End: 2020-08-28

## 2020-08-28 RX ORDER — COLESEVELAM 625 MG/1
TAKE 1 TO 3 TABLETS BY MOUTH DAILY WITH LUNCH TABLET, FILM COATED ORAL
Qty: 90 | Refills: 5

## 2020-09-23 ENCOUNTER — OFFICE VISIT (OUTPATIENT)
Dept: URBAN - METROPOLITAN AREA CLINIC 105 | Facility: CLINIC | Age: 60
End: 2020-09-23
Payer: COMMERCIAL

## 2020-09-23 ENCOUNTER — LAB OUTSIDE AN ENCOUNTER (OUTPATIENT)
Dept: URBAN - METROPOLITAN AREA CLINIC 105 | Facility: CLINIC | Age: 60
End: 2020-09-23

## 2020-09-23 DIAGNOSIS — L21.9 SEBORRHEA: ICD-10-CM

## 2020-09-23 DIAGNOSIS — K50.00 CROHN'S DISEASE INVOLVING TERMINAL ILEUM: ICD-10-CM

## 2020-09-23 DIAGNOSIS — R19.7 DIARRHEA: ICD-10-CM

## 2020-09-23 DIAGNOSIS — K58.9 IBS (IRRITABLE BOWEL SYNDROME): ICD-10-CM

## 2020-09-23 DIAGNOSIS — Z86.010 PERSONAL HISTORY OF COLON POLYPS: ICD-10-CM

## 2020-09-23 PROCEDURE — 3017F COLORECTAL CA SCREEN DOC REV: CPT | Performed by: INTERNAL MEDICINE

## 2020-09-23 PROCEDURE — G8417 CALC BMI ABV UP PARAM F/U: HCPCS | Performed by: INTERNAL MEDICINE

## 2020-09-23 PROCEDURE — 86480 TB TEST CELL IMMUN MEASURE: CPT | Performed by: INTERNAL MEDICINE

## 2020-09-23 PROCEDURE — G8427 DOCREV CUR MEDS BY ELIG CLIN: HCPCS | Performed by: INTERNAL MEDICINE

## 2020-09-23 PROCEDURE — 99214 OFFICE O/P EST MOD 30 MIN: CPT | Performed by: INTERNAL MEDICINE

## 2020-09-23 PROCEDURE — G9903 PT SCRN TBCO ID AS NON USER: HCPCS | Performed by: INTERNAL MEDICINE

## 2020-09-23 RX ORDER — COLESEVELAM 625 MG/1
TAKE 1 TO 3 TABLETS BY MOUTH DAILY WITH LUNCH TABLET, FILM COATED ORAL
Qty: 90 | Refills: 5 | Status: ACTIVE | COMMUNITY

## 2020-09-23 RX ORDER — FLUTICASONE PROPIONATE 50 UG/1
SPRAY 1 SPRAY (50 MCG) IN EACH NOSTRIL BY INTRANASAL ROUTE ONCE DAILY SPRAY, METERED NASAL 1
Qty: 1 | Refills: 0 | Status: ACTIVE | COMMUNITY
Start: 1900-01-01

## 2020-09-23 RX ORDER — CETIRIZINE HYDROCHLORIDE 10 MG/1
TAKE 1 TABLET (10 MG) BY ORAL ROUTE ONCE DAILY TABLET, FILM COATED ORAL 1
Qty: 0 | Refills: 0 | Status: ACTIVE | COMMUNITY
Start: 1900-01-01

## 2020-09-23 RX ORDER — ROSUVASTATIN CALCIUM 40 MG/1
TAKE 1 TABLET (40 MG) BY ORAL ROUTE ONCE DAILY TABLET, FILM COATED ORAL 1
Qty: 0 | Refills: 0 | Status: ACTIVE | COMMUNITY
Start: 1900-01-01

## 2020-09-23 RX ORDER — PROPRANOLOL HYDROCHLORIDE 10 MG/1
TAKE 1 TABLET (10 MG) BY ORAL ROUTE  ONCE PER DAY TABLET ORAL
Qty: 0 | Refills: 0 | Status: ACTIVE | COMMUNITY
Start: 1900-01-01

## 2020-09-23 RX ORDER — LISINOPRIL AND HYDROCHLOROTHIAZIDE TABLETS 20; 25 MG/1; MG/1
TAKE 1/2  TABLET BY ORAL ROUTE ONCE DAILY TABLET ORAL 1
Qty: 0 | Refills: 0 | Status: ACTIVE | COMMUNITY
Start: 1900-01-01

## 2020-09-23 RX ORDER — VALACYCLOVIR HCL 500 MG
TAKE 1 TABLET (500 MG) BY ORAL ROUTE ONCE DAILY TABLET ORAL 1
Qty: 0 | Refills: 0 | Status: ACTIVE | COMMUNITY
Start: 1900-01-01

## 2020-09-23 RX ORDER — FINASTERIDE 5 MG/1
TAKE 1 TABLET (5 MG) BY ORAL ROUTE ONCE DAILY TABLET, FILM COATED ORAL 1
Qty: 0 | Refills: 0 | Status: ACTIVE | COMMUNITY
Start: 1900-01-01

## 2020-09-23 RX ORDER — BUPROPION HCL 300 MG
TAKE 1 TABLET (300 MG) BY ORAL ROUTE ONCE DAILY TABLET, EXTENDED RELEASE 24 HR ORAL 1
Qty: 0 | Refills: 0 | Status: ACTIVE | COMMUNITY
Start: 1900-01-01

## 2020-09-23 RX ORDER — COLESEVELAM HYDROCHLORIDE 625 MG/1
TAKE 1-3 TABLETS BY ORAL ROUTE PER DAY WITH LUNCH TABLET, FILM COATED ORAL 2
Qty: 90 | Refills: 5 | Status: ACTIVE | COMMUNITY
Start: 2019-08-06

## 2020-09-23 NOTE — HPI-OTHER HISTORIES
The patient presents on follow-up after initially presenting for Crohn's disease involving the terminal ileum. He had been having diarrhea and abdominal pain since March 2017. He would take Imodium when he had intermittent diarrhea.  The patient stated he had a trip to [place] in Jan. and Feb 2017. He had gotten a parasite in his previous trips but these symptoms were different. He was having loose, watery diarrhea. Some days he could have formed stool. He could go up to 8 BMs in the day. He would usually have 5 BMs daily most in the AM. One pill of Imodium provided relief for the rest of the day. Stress and eating could exacerbate symptoms. He noted he had been under a lot of work stress and wondered if this was IBS. The patient stated that hyoscyamine had provided 50% relief of his abdominal pain. He localized his LLQ pain over his previous surgical scar. He noted seeing a cosmetic surgeon because of the unevenness of the sides of the scar, but they differed any further management to his original surgeon.   Review of his [facility] chart noted an admit from 9/21/15 to 9/28/15 where he was noted to have a SB perforation. He had an exp lap with small bowel resection on 9/21/15. His later course was complicated by two intra-abdominal abscesses subsequently requiring I&D of peritoneal abscess with washout on 10/8/17. The cause of the small bowel perforation was not known. The patient had a colonoscopy on 4/28/17 which noted multiple ulcers in the TI and biopsies were significant for active ileitis. The patient had only taken Tylenol PRN since his resection. He denied NSAID use. He had a repeat colonoscopy on 8/11/17 which noted a few erosions in the TI consistent with ileal Crohn's; biopsies noted active enteritis with ulcer.   On visit 6/02/17, patient noted his bowel habit had improved and his abdominal pain was better after taking Cipro x 10 days. His ANCA/ASCA lab were negative for IBD. On visit 9/21/17, he stated he began having abdominal discomfort 1 x week and non-bloody diarrhea with a fever up to 101F on 9/18/17. He denied any upper GI symptoms with it. He was started on Cipro/Flagyl by Dr. Galvan. Then on visit 9/28/17 he stated his diarrhea had improved. He denied any watery diarrhea. He had 6 BMs before clinic visit with soft stool. He reported having abdominal pain on 9/26/17 with a temperature up to 100.8. He stated he woke up with a fever on 9/27/17 morning which resolved. On visit 10/10/17 he had finished his antibiotic treatment and stated that his BMs had improved with 4-5 BMs with formation but denied watery diarrhea. He also noted intermittent lower abdominal pain.   On visit 12/22/17 he stated he had no issues with diarrhea and denied abdominal pain. He had started the 6-MP 50 mg daily x 1 month and then decreased to 25 mg x 1 month because he had concerns that the 6-MP was increasing his susceptibility to having a cold. On 3/26/18, he stated he was doing well on 6-mp 50 mg daily. He denied significant diarrhea and abdominal pain.   On 6/28/18, the patient mentioned that last week his stomach felt sensitive and he experienced some cramping. He reported taking hyoscamine to help with the abdominal discomfort. He also reported his discomfort could be stress related due to stress from his job as an . The patient mentioned that he was doing well on the 6mp 50mg QD.  He stated he recently went to [facility] for work. During his travel, he experienced abdominal discomfort after eating the airplane food that was served. He also stated having one loose stool but no diarrhea after that. He was scheduled to return to [facility] next week. The patient denied any other GI symptoms.   A colonoscopy on 8/27/18 revealed a single ulcer and focal active enteritis in the terminal ileum.   On 9/24/18, it was discussed with the patient about increasing his 6-mp due to some inflammation still present in the ileum while on 6-mp 50 mg QD. The patient noted having to very rarely use hyoscamine. He denied any other GI symptoms.   On 10/15/18, the patient noted he was doing well on 6- mg QD. He had been experiencing a rash on his face in the past month and wanted to know if it was related to the higher dose of 6-MP. He denied any other GI symptoms.   On 12/5/18, He noted no improvement in his rash on 6-mp 50 mg daily so he discontinued 6-mp. Off for 1-2 weeks he did not have a rash.  He resumed at 50 mg and had a recurrent facial rash.  He had seen his dermatologist when the rash had resolved and had a follow-up the next week after the rash had reoccured.  He also followed with an allergist, Dr. Ángel Chew and he noted he is "allergic to everything" and is on allergy shots. He denied any GI symptoms. He stated that he would use the hyoscyamine maybe once a year.   On 1/16/19, he reported that his facial rash had not returned since his last visit and he was doing well. He saw a dermatologist who had no further recomendation. He denied any GI symptoms.  On 2/27/19, he said that starting Friday he had bloating, diarrhea, loss of appetite, and abdominal pain and these symptoms were persisting. He traveled to [facility] Wednesday and returned Friday night when he started feeling worse. He noted he had occasional nausea as well. He had not felt sick after previous trips to [facility]. He stopped taking 6-MP because he got a rash. He said he had taken prednisone in the past and believed he tolerated it.   On 3/20/19, he said he was having 1-2 BMs/day with occasional watery diarrhea. On Monday and Tuesday he noted having nausea and mild periumbilical pain. He took hyoscyamine SL 0.125 mg PRN which provided relief. He took ondansetron on Monday and said it worked for a while, but had to take it again a few hours later. He took Cipro 500 mg for 7 days continues on prednisone 20 mg QD and said this regimen had helped. He was going to start on Humira injections on Friday.  On 4/10/19, he said he had started Humira and denied having a reaction to it. He noted decreased BM frequency with 4-5 soft/loose BMs/day. He denied abdominal pain and said he was eating ok. He noted taking prednisone 20 mg QD had affected his sleep and made him more irritable. He also noted that the rash on his face came back when he reduced the prednisone to 10 mg daily on his own.  It then resolved when he went back up to 20 mg daily.  On 5/1/19, he said he was doing well. He was still tapering off prednisone and was on 5 mg QD. He noted 2-3 soft, not watery BMs QD. He denied abdominal pain and his appetite was good. He had not needed any hyoscyamine. He noted his face rash was slightly noticeable.  On 8/6/19, he said his facial rash was better after using a topical medicine from his dermatologist. He was getting Humira every 2 weeks. He noted episodes every 3 weeks that would last for 3 days with 5-6 watery BMs/day, or some days he would have no BM. He was off prednisone. He still took trazodone for insomnia which helped him sleep.   On 11/12/19, he was taking Welchol 625 mg 1 pill QD. He took it at lunch or dinner. He noted a benefit. He had 2-3 BMs/day. They were formed 80% of the time.  He hadn't had any recent lab work with Dr. Escamilla. His physical was at the end of December. Recently, he had traveled to places like [place], [facility], and [place] [facility].   Today, he says he had a flare-up a couple weeks ago where he was having 6 BMs QAM. He was having frequent formed stools with some LLQ pain. He usually has 2-3 formed or soft BMs/day without blood. He continues on Humira every 2 weeks (injects into his thigh).  Labs 3/29/19 - CBC, CMP, Lipids, B12, TSH, Vitamin D all normal. 2/27/19 - GI stool panel negative. CBC, CMP, and Vitamin B12 normal.  HCV Ab, HBsAG, and Quant Gold TB all negative. CRP 10.8, Hep B surface Ab reactive.  10/15/18 - CBC normal, CMP normal, 6-, 6-MMPN 2361 on 6-mp 100 mg daily. 6/28/18- CBC normal, CMP normal.  3/26/18- CBC normal, CMP normal, 6TG 84, 6MMPN 132 on 6-mp 50 mg daily.  12/22/17 - CBC/CMP normal. CRP 0.6.   9/28/17 CBC/CMP normal, CRP 52, sed rate 8.   9/2/17 Stool campylobacter/shiga toxin negative, C diff cx and toxin A/B negative, O&Px1 negative.  Stool salmonella/shigella pending. 9/6/17 [facility] - negative.   8/31/17 TPMT 23.7.   4/11/17 GI pathogen stool study negative, but does not test for Yersenia.   4/11/17 TSH/FT4 normal, CBC normal, CMP normal except for a sodium of 145.  ttg IgA negative, IgA level normal, sed rate 2/normal, CRP normal, lipase 47/normal.

## 2020-09-28 LAB
A/G RATIO: 2.1
ALBUMIN: 4.5
ALKALINE PHOSPHATASE: 57
ALT (SGPT): 21
AST (SGOT): 18
BASO (ABSOLUTE): 0
BASOS: 1
BILIRUBIN, TOTAL: <0.2
BUN/CREATININE RATIO: 14
BUN: 13
CALCIUM: 9.4
CARBON DIOXIDE, TOTAL: 23
CHLORIDE: 104
CREATININE: 0.94
EGFR IF AFRICN AM: 102
EGFR IF NONAFRICN AM: 88
EOS (ABSOLUTE): 0.1
EOS: 1
GLOBULIN, TOTAL: 2.1
GLUCOSE: 92
HEMATOCRIT: 44.2
HEMATOLOGY COMMENTS:: (no result)
HEMOGLOBIN: 14.6
IMMATURE CELLS: (no result)
IMMATURE GRANS (ABS): 0
IMMATURE GRANULOCYTES: 0
LYMPHS (ABSOLUTE): 1.8
LYMPHS: 36
MCH: 31.1
MCHC: 33
MCV: 94
MONOCYTES(ABSOLUTE): 0.5
MONOCYTES: 11
NEUTROPHILS (ABSOLUTE): 2.5
NEUTROPHILS: 51
NRBC: (no result)
PLATELETS: 179
POTASSIUM: 4.5
PROTEIN, TOTAL: 6.6
QUANTIFERON CRITERIA: (no result)
QUANTIFERON INCUBATION: (no result)
QUANTIFERON MITOGEN VALUE: >10
QUANTIFERON NIL VALUE: 0.03
QUANTIFERON TB1 AG VALUE: 0.03
QUANTIFERON TB2 AG VALUE: 0.03
QUANTIFERON-TB GOLD PLUS: NEGATIVE
RBC: 4.69
RDW: 11.8
SODIUM: 140
WBC: 4.9

## 2020-11-19 ENCOUNTER — OFFICE VISIT (OUTPATIENT)
Dept: URBAN - METROPOLITAN AREA SURGERY CENTER 16 | Facility: SURGERY CENTER | Age: 60
End: 2020-11-19
Payer: COMMERCIAL

## 2020-11-19 ENCOUNTER — CLAIMS CREATED FROM THE CLAIM WINDOW (OUTPATIENT)
Dept: URBAN - METROPOLITAN AREA CLINIC 4 | Facility: CLINIC | Age: 60
End: 2020-11-19
Payer: COMMERCIAL

## 2020-11-19 ENCOUNTER — TELEPHONE ENCOUNTER (OUTPATIENT)
Dept: URBAN - METROPOLITAN AREA CLINIC 92 | Facility: CLINIC | Age: 60
End: 2020-11-19

## 2020-11-19 DIAGNOSIS — K63.89 OTHER SPECIFIED DISEASES OF INTESTINE: ICD-10-CM

## 2020-11-19 DIAGNOSIS — K50.00 CICATRIZING ENTEROCOLITIS: ICD-10-CM

## 2020-11-19 PROBLEM — 56689002: Status: ACTIVE | Noted: 2020-11-19

## 2020-11-19 PROCEDURE — 88305 TISSUE EXAM BY PATHOLOGIST: CPT | Performed by: PATHOLOGY

## 2020-11-19 PROCEDURE — G9937 DIG OR SURV COLSCO: HCPCS | Performed by: INTERNAL MEDICINE

## 2020-11-19 PROCEDURE — 45380 COLONOSCOPY AND BIOPSY: CPT | Performed by: INTERNAL MEDICINE

## 2020-11-19 PROCEDURE — G8907 PT DOC NO EVENTS ON DISCHARG: HCPCS | Performed by: INTERNAL MEDICINE

## 2020-11-19 RX ORDER — PROPRANOLOL HYDROCHLORIDE 10 MG/1
TAKE 1 TABLET (10 MG) BY ORAL ROUTE  ONCE PER DAY TABLET ORAL
Qty: 0 | Refills: 0 | Status: ACTIVE | COMMUNITY
Start: 1900-01-01

## 2020-11-19 RX ORDER — FINASTERIDE 5 MG/1
TAKE 1 TABLET (5 MG) BY ORAL ROUTE ONCE DAILY TABLET, FILM COATED ORAL 1
Qty: 0 | Refills: 0 | Status: ACTIVE | COMMUNITY
Start: 1900-01-01

## 2020-11-19 RX ORDER — ROSUVASTATIN CALCIUM 40 MG/1
TAKE 1 TABLET (40 MG) BY ORAL ROUTE ONCE DAILY TABLET, FILM COATED ORAL 1
Qty: 0 | Refills: 0 | Status: ACTIVE | COMMUNITY
Start: 1900-01-01

## 2020-11-19 RX ORDER — CETIRIZINE HYDROCHLORIDE 10 MG/1
TAKE 1 TABLET (10 MG) BY ORAL ROUTE ONCE DAILY TABLET, FILM COATED ORAL 1
Qty: 0 | Refills: 0 | Status: ACTIVE | COMMUNITY
Start: 1900-01-01

## 2020-11-19 RX ORDER — BUPROPION HCL 300 MG
TAKE 1 TABLET (300 MG) BY ORAL ROUTE ONCE DAILY TABLET, EXTENDED RELEASE 24 HR ORAL 1
Qty: 0 | Refills: 0 | Status: ACTIVE | COMMUNITY
Start: 1900-01-01

## 2020-11-19 RX ORDER — LISINOPRIL AND HYDROCHLOROTHIAZIDE TABLETS 20; 25 MG/1; MG/1
TAKE 1/2  TABLET BY ORAL ROUTE ONCE DAILY TABLET ORAL 1
Qty: 0 | Refills: 0 | Status: ACTIVE | COMMUNITY
Start: 1900-01-01

## 2020-11-19 RX ORDER — COLESEVELAM 625 MG/1
TAKE 1 TO 3 TABLETS BY MOUTH DAILY WITH LUNCH TABLET, FILM COATED ORAL
Qty: 90 | Refills: 5 | Status: ACTIVE | COMMUNITY

## 2020-11-19 RX ORDER — VALACYCLOVIR HCL 500 MG
TAKE 1 TABLET (500 MG) BY ORAL ROUTE ONCE DAILY TABLET ORAL 1
Qty: 0 | Refills: 0 | Status: ACTIVE | COMMUNITY
Start: 1900-01-01

## 2020-11-19 RX ORDER — COLESEVELAM HYDROCHLORIDE 625 MG/1
TAKE 1-3 TABLETS BY ORAL ROUTE PER DAY WITH LUNCH TABLET, FILM COATED ORAL 2
Qty: 90 | Refills: 5 | Status: ACTIVE | COMMUNITY
Start: 2019-08-06

## 2020-11-19 RX ORDER — FLUTICASONE PROPIONATE 50 UG/1
SPRAY 1 SPRAY (50 MCG) IN EACH NOSTRIL BY INTRANASAL ROUTE ONCE DAILY SPRAY, METERED NASAL 1
Qty: 1 | Refills: 0 | Status: ACTIVE | COMMUNITY
Start: 1900-01-01

## 2020-12-02 ENCOUNTER — LAB OUTSIDE AN ENCOUNTER (OUTPATIENT)
Dept: URBAN - METROPOLITAN AREA CLINIC 105 | Facility: CLINIC | Age: 60
End: 2020-12-02

## 2020-12-08 ENCOUNTER — OFFICE VISIT (OUTPATIENT)
Dept: URBAN - METROPOLITAN AREA CLINIC 105 | Facility: CLINIC | Age: 60
End: 2020-12-08
Payer: COMMERCIAL

## 2020-12-08 DIAGNOSIS — R19.7 DIARRHEA: ICD-10-CM

## 2020-12-08 DIAGNOSIS — K50.00 CROHN'S DISEASE INVOLVING TERMINAL ILEUM: ICD-10-CM

## 2020-12-08 DIAGNOSIS — L21.9 SEBORRHEA: ICD-10-CM

## 2020-12-08 DIAGNOSIS — Z86.010 PERSONAL HISTORY OF COLON POLYPS: ICD-10-CM

## 2020-12-08 DIAGNOSIS — K58.9 IBS (IRRITABLE BOWEL SYNDROME): ICD-10-CM

## 2020-12-08 PROCEDURE — 3017F COLORECTAL CA SCREEN DOC REV: CPT | Performed by: INTERNAL MEDICINE

## 2020-12-08 PROCEDURE — G9903 PT SCRN TBCO ID AS NON USER: HCPCS | Performed by: INTERNAL MEDICINE

## 2020-12-08 PROCEDURE — G8417 CALC BMI ABV UP PARAM F/U: HCPCS | Performed by: INTERNAL MEDICINE

## 2020-12-08 PROCEDURE — 99214 OFFICE O/P EST MOD 30 MIN: CPT | Performed by: INTERNAL MEDICINE

## 2020-12-08 PROCEDURE — G8427 DOCREV CUR MEDS BY ELIG CLIN: HCPCS | Performed by: INTERNAL MEDICINE

## 2020-12-08 PROCEDURE — G8482 FLU IMMUNIZE ORDER/ADMIN: HCPCS | Performed by: INTERNAL MEDICINE

## 2020-12-08 RX ORDER — AMITRIPTYLINE HYDROCHLORIDE 10 MG/1
1 -2 TABLETS TABLET, FILM COATED ORAL
Qty: 60 | Refills: 1 | OUTPATIENT
Start: 2020-12-08

## 2020-12-08 RX ORDER — BUPROPION HCL 300 MG
TAKE 1 TABLET (300 MG) BY ORAL ROUTE ONCE DAILY TABLET, EXTENDED RELEASE 24 HR ORAL 1
Qty: 0 | Refills: 0 | Status: ACTIVE | COMMUNITY
Start: 1900-01-01

## 2020-12-08 RX ORDER — COLESEVELAM 625 MG/1
TAKE 1 TO 3 TABLETS BY MOUTH DAILY WITH LUNCH TABLET, FILM COATED ORAL
Qty: 90 | Refills: 5 | Status: ACTIVE | COMMUNITY

## 2020-12-08 RX ORDER — ROSUVASTATIN CALCIUM 40 MG/1
TAKE 1 TABLET (40 MG) BY ORAL ROUTE ONCE DAILY TABLET, FILM COATED ORAL 1
Qty: 0 | Refills: 0 | Status: ACTIVE | COMMUNITY
Start: 1900-01-01

## 2020-12-08 RX ORDER — CETIRIZINE HYDROCHLORIDE 10 MG/1
TAKE 1 TABLET (10 MG) BY ORAL ROUTE ONCE DAILY TABLET, FILM COATED ORAL 1
Qty: 0 | Refills: 0 | Status: ACTIVE | COMMUNITY
Start: 1900-01-01

## 2020-12-08 RX ORDER — FINASTERIDE 5 MG/1
TAKE 1 TABLET (5 MG) BY ORAL ROUTE ONCE DAILY TABLET, FILM COATED ORAL 1
Qty: 0 | Refills: 0 | Status: ACTIVE | COMMUNITY
Start: 1900-01-01

## 2020-12-08 RX ORDER — PROPRANOLOL HYDROCHLORIDE 10 MG/1
TAKE 1 TABLET (10 MG) BY ORAL ROUTE  ONCE PER DAY TABLET ORAL
Qty: 0 | Refills: 0 | Status: ACTIVE | COMMUNITY
Start: 1900-01-01

## 2020-12-08 RX ORDER — LISINOPRIL AND HYDROCHLOROTHIAZIDE TABLETS 20; 25 MG/1; MG/1
TAKE 1/2  TABLET BY ORAL ROUTE ONCE DAILY TABLET ORAL 1
Qty: 0 | Refills: 0 | Status: ACTIVE | COMMUNITY
Start: 1900-01-01

## 2020-12-08 RX ORDER — FLUTICASONE PROPIONATE 50 UG/1
SPRAY 1 SPRAY (50 MCG) IN EACH NOSTRIL BY INTRANASAL ROUTE ONCE DAILY SPRAY, METERED NASAL 1
Qty: 1 | Refills: 0 | Status: ACTIVE | COMMUNITY
Start: 1900-01-01

## 2020-12-08 RX ORDER — VALACYCLOVIR HCL 500 MG
TAKE 1 TABLET (500 MG) BY ORAL ROUTE ONCE DAILY TABLET ORAL 1
Qty: 0 | Refills: 0 | Status: ACTIVE | COMMUNITY
Start: 1900-01-01

## 2020-12-08 NOTE — HPI-OTHER HISTORIES
The patient presents on follow-up after initially presenting for Crohn's disease involving the terminal ileum. He had been having diarrhea and abdominal pain since March 2017. He would take Imodium when he had intermittent diarrhea.  The patient stated he had a trip to [place] in Jan. and Feb 2017. He had gotten a parasite in his previous trips but these symptoms were different. He was having loose, watery diarrhea. Some days he could have formed stool. He could go up to 8 BMs in the day. He would usually have 5 BMs daily most in the AM. One pill of Imodium provided relief for the rest of the day. Stress and eating could exacerbate symptoms. He noted he had been under a lot of work stress and wondered if this was IBS. The patient stated that hyoscyamine had provided 50% relief of his abdominal pain. He localized his LLQ pain over his previous surgical scar. He noted seeing a cosmetic surgeon because of the unevenness of the sides of the scar, but they differed any further management to his original surgeon.   Review of his [facility] chart noted an admit from 9/21/15 to 9/28/15 where he was noted to have a SB perforation. He had an exp lap with small bowel resection on 9/21/15. His later course was complicated by two intra-abdominal abscesses subsequently requiring I&D of peritoneal abscess with washout on 10/8/17. The cause of the small bowel perforation was not known. The patient had a colonoscopy on 4/28/17 which noted multiple ulcers in the TI and biopsies were significant for active ileitis. The patient had only taken Tylenol PRN since his resection. He denied NSAID use. He had a repeat colonoscopy on 8/11/17 which noted a few erosions in the TI consistent with ileal Crohn's; biopsies noted active enteritis with ulcer.   On visit 6/02/17, patient noted his bowel habit had improved and his abdominal pain was better after taking Cipro x 10 days. His ANCA/ASCA lab were negative for IBD. On visit 9/21/17, he stated he began having abdominal discomfort 1 x week and non-bloody diarrhea with a fever up to 101F on 9/18/17. He denied any upper GI symptoms with it. He was started on Cipro/Flagyl by Dr. Galvan. Then on visit 9/28/17 he stated his diarrhea had improved. He denied any watery diarrhea. He had 6 BMs before clinic visit with soft stool. He reported having abdominal pain on 9/26/17 with a temperature up to 100.8. He stated he woke up with a fever on 9/27/17 morning which resolved. On visit 10/10/17 he had finished his antibiotic treatment and stated that his BMs had improved with 4-5 BMs with formation but denied watery diarrhea. He also noted intermittent lower abdominal pain.   On visit 12/22/17 he stated he had no issues with diarrhea and denied abdominal pain. He had started the 6-MP 50 mg daily x 1 month and then decreased to 25 mg x 1 month because he had concerns that the 6-MP was increasing his susceptibility to having a cold. On 3/26/18, he stated he was doing well on 6-mp 50 mg daily. He denied significant diarrhea and abdominal pain.   On 6/28/18, the patient mentioned that last week his stomach felt sensitive and he experienced some cramping. He reported taking hyoscamine to help with the abdominal discomfort. He also reported his discomfort could be stress related due to stress from his job as an . The patient mentioned that he was doing well on the 6mp 50mg QD.  He stated he recently went to [place] for work. During his travel, he experienced abdominal discomfort after eating the airplane food that was served. He also stated having one loose stool but no diarrhea after that. He was scheduled to return to [place] [facility] [place] [facility]\[place] next week. The patient denied any other GI symptoms.   A colonoscopy on 8/27/18 revealed a single ulcer and focal active enteritis in the terminal ileum.   On 9/24/18, it was discussed with the patient about increasing his 6-mp due to some inflammation still present in the ileum while on 6-mp 50 mg QD. The patient noted having to very rarely use hyoscamine. He denied any other GI symptoms.   On 10/15/18, the patient noted he was doing well on 6- mg QD. He had been experiencing a rash on his face in the past month and wanted to know if it was related to the higher dose of 6-MP. He denied any other GI symptoms.   On 12/5/18, He noted no improvement in his rash on 6-mp 50 mg daily so he discontinued 6-mp. Off for 1-2 weeks he did not have a rash.  He resumed at 50 mg and had a recurrent facial rash.  He had seen his dermatologist when the rash had resolved and had a follow-up the next week after the rash had reoccured.  He also followed with an allergist, Dr. Ángel Chew and he noted he is "allergic to everything" and is on allergy shots. He denied any GI symptoms. He stated that he would use the hyoscyamine maybe once a year.   On 1/16/19, he reported that his facial rash had not returned since his last visit and he was doing well. He saw a dermatologist who had no further recomendation. He denied any GI symptoms.  On 2/27/19, he said that starting Friday he had bloating, diarrhea, loss of appetite, and abdominal pain and these symptoms were persisting. He traveled to [place] [facility] [place] [facility] Wednesday and returned Friday night when he started feeling worse. He noted he had occasional nausea as well. He had not felt sick after previous trips to [place] [facility] [place] [facility]". He stopped taking 6-MP because he got a rash. He said he had taken prednisone in the past and believed he tolerated it.   On 3/20/19, he said he was having 1-2 BMs/day with occasional watery diarrhea. On Monday and Tuesday he noted having nausea and mild periumbilical pain. He took hyoscyamine SL 0.125 mg PRN which provided relief. He took ondansetron on Monday and said it worked for a while, but had to take it again a few hours later. He took Cipro 500 mg for 7 days continues on prednisone 20 mg QD and said this regimen had helped. He was going to start on Humira injections on Friday.  On 4/10/19, he said he had started Humira and denied having a reaction to it. He noted decreased BM frequency with 4-5 soft/loose BMs/day. He denied abdominal pain and said he was eating ok. He noted taking prednisone 20 mg QD had affected his sleep and made him more irritable. He also noted that the rash on his face came back when he reduced the prednisone to 10 mg daily on his own.  It then resolved when he went back up to 20 mg daily.  On 5/1/19, he said he was doing well. He was still tapering off prednisone and was on 5 mg QD. He noted 2-3 soft, not watery BMs QD. He denied abdominal pain and his appetite was good. He had not needed any hyoscyamine. He noted his face rash was slightly noticeable.  On 8/6/19, he said his facial rash was better after using a topical medicine from his dermatologist. He was getting Humira every 2 weeks. He noted episodes every 3 weeks that would last for 3 days with 5-6 watery BMs/day, or some days he would have no BM. He was off prednisone. He still took trazodone for insomnia which helped him sleep.   On 11/12/19, he was taking Welchol 625 mg 1 pill QD. He took it at lunch or dinner. He noted a benefit. He had 2-3 BMs/day. They were formed 80% of the time.  He hadn't had any recent lab work with Dr. Escamilla. His physical was at the end of December. Recently, he had traveled to places like [place], [place] and [place] [facility].   On 9/23/20, he said he had a flare-up a couple weeks ago where he was having 6 BMs QAM. He was having frequent formed stools with some LLQ pain. He usually had 2-3 formed or soft BMs/day without blood. He continued on Humira every 2 weeks (injects into his thigh).  Today, he says he is in the middle of a "flare-up." He has 4-5 medium formed BMs/day with lower abdominal pain and urgency. Pain resolves after a BM he states.  He denies watery diarrhea and he denies rectal bleeding. He is on Colesevelam 2 pills/day with lunch 3-4x/week - which does help to form his BMs. He had his adalimumab+ab levels checked last week - lab pending. He takes trazadone 100 mg (cuts 150 mg pill) for insomnia.  Labs 9/23/20 - Tb negative. CBC, CMP all normal.   3/29/19 - CBC, CMP, Lipids, B12, TSH, Vitamin D all normal. 2/27/19 - GI stool panel negative. CBC, CMP, and Vitamin B12 normal.  HCV Ab, HBsAG, and Quant Gold TB all negative. CRP 10.8, Hep B surface Ab reactive.  10/15/18 - CBC normal, CMP normal, 6-, 6-MMPN 2361 on 6-mp 100 mg daily. 6/28/18- CBC normal, CMP normal.  3/26/18- CBC normal, CMP normal, 6TG 84, 6MMPN 132 on 6-mp 50 mg daily.  12/22/17 - CBC/CMP normal. CRP 0.6.   9/28/17 CBC/CMP normal, CRP 52, sed rate 8.   9/2/17 Stool campylobacter/shiga toxin negative, C diff cx and toxin A/B negative, O&Px1 negative.  Stool salmonella/shigella pending. 9/6/17 [facility] - negative.   8/31/17 TPMT 23.7.   4/11/17 GI pathogen stool study negative, but does not test for Yersenia.   4/11/17 TSH/FT4 normal, CBC normal, CMP normal except for a sodium of 145.  ttg IgA negative, IgA level normal, sed rate 2/normal, CRP normal, lipase 47/normal.

## 2020-12-10 LAB
ADALIMUMAB DRUG LEVEL: 8.7
ANTI-ADALIMUMAB ANTIBODY: 74

## 2021-01-19 ENCOUNTER — OFFICE VISIT (OUTPATIENT)
Dept: URBAN - METROPOLITAN AREA CLINIC 105 | Facility: CLINIC | Age: 61
End: 2021-01-19
Payer: COMMERCIAL

## 2021-01-19 VITALS
TEMPERATURE: 96.8 F | DIASTOLIC BLOOD PRESSURE: 81 MMHG | HEIGHT: 71 IN | BODY MASS INDEX: 30.63 KG/M2 | SYSTOLIC BLOOD PRESSURE: 125 MMHG | WEIGHT: 218.8 LBS | HEART RATE: 85 BPM

## 2021-01-19 DIAGNOSIS — R19.7 DIARRHEA: ICD-10-CM

## 2021-01-19 DIAGNOSIS — L21.9 SEBORRHEA: ICD-10-CM

## 2021-01-19 DIAGNOSIS — K50.00 CROHN'S DISEASE INVOLVING TERMINAL ILEUM: ICD-10-CM

## 2021-01-19 DIAGNOSIS — K58.9 IBS (IRRITABLE BOWEL SYNDROME): ICD-10-CM

## 2021-01-19 DIAGNOSIS — Z86.010 PERSONAL HISTORY OF COLON POLYPS: ICD-10-CM

## 2021-01-19 PROCEDURE — 99213 OFFICE O/P EST LOW 20 MIN: CPT | Performed by: INTERNAL MEDICINE

## 2021-01-19 PROCEDURE — G9903 PT SCRN TBCO ID AS NON USER: HCPCS | Performed by: INTERNAL MEDICINE

## 2021-01-19 PROCEDURE — 3017F COLORECTAL CA SCREEN DOC REV: CPT | Performed by: INTERNAL MEDICINE

## 2021-01-19 PROCEDURE — G8482 FLU IMMUNIZE ORDER/ADMIN: HCPCS | Performed by: INTERNAL MEDICINE

## 2021-01-19 PROCEDURE — G8427 DOCREV CUR MEDS BY ELIG CLIN: HCPCS | Performed by: INTERNAL MEDICINE

## 2021-01-19 PROCEDURE — G8417 CALC BMI ABV UP PARAM F/U: HCPCS | Performed by: INTERNAL MEDICINE

## 2021-01-19 RX ORDER — BUPROPION HCL 300 MG
TAKE 1 TABLET (300 MG) BY ORAL ROUTE ONCE DAILY TABLET, EXTENDED RELEASE 24 HR ORAL 1
Qty: 0 | Refills: 0 | Status: ACTIVE | COMMUNITY
Start: 1900-01-01

## 2021-01-19 RX ORDER — PROPRANOLOL HYDROCHLORIDE 10 MG/1
TAKE 1 TABLET (10 MG) BY ORAL ROUTE  ONCE PER DAY TABLET ORAL
Qty: 0 | Refills: 0 | Status: ACTIVE | COMMUNITY
Start: 1900-01-01

## 2021-01-19 RX ORDER — FINASTERIDE 5 MG/1
TAKE 1 TABLET (5 MG) BY ORAL ROUTE ONCE DAILY TABLET, FILM COATED ORAL 1
Qty: 0 | Refills: 0 | Status: ACTIVE | COMMUNITY
Start: 1900-01-01

## 2021-01-19 RX ORDER — CETIRIZINE HYDROCHLORIDE 10 MG/1
TAKE 1 TABLET (10 MG) BY ORAL ROUTE ONCE DAILY TABLET, FILM COATED ORAL 1
Qty: 0 | Refills: 0 | Status: ACTIVE | COMMUNITY
Start: 1900-01-01

## 2021-01-19 RX ORDER — AMITRIPTYLINE HYDROCHLORIDE 10 MG/1
1 -2 TABLETS TABLET, FILM COATED ORAL
Qty: 60 | Refills: 1 | Status: ACTIVE | COMMUNITY
Start: 2020-12-08

## 2021-01-19 RX ORDER — ROSUVASTATIN CALCIUM 40 MG/1
TAKE 1 TABLET (40 MG) BY ORAL ROUTE ONCE DAILY TABLET, FILM COATED ORAL 1
Qty: 0 | Refills: 0 | Status: ACTIVE | COMMUNITY
Start: 1900-01-01

## 2021-01-19 RX ORDER — FLUTICASONE PROPIONATE 50 UG/1
SPRAY 1 SPRAY (50 MCG) IN EACH NOSTRIL BY INTRANASAL ROUTE ONCE DAILY SPRAY, METERED NASAL 1
Qty: 1 | Refills: 0 | Status: ACTIVE | COMMUNITY
Start: 1900-01-01

## 2021-01-19 RX ORDER — LISINOPRIL AND HYDROCHLOROTHIAZIDE TABLETS 20; 25 MG/1; MG/1
TAKE 1/2  TABLET BY ORAL ROUTE ONCE DAILY TABLET ORAL 1
Qty: 0 | Refills: 0 | Status: ACTIVE | COMMUNITY
Start: 1900-01-01

## 2021-01-19 RX ORDER — COLESEVELAM 625 MG/1
TAKE 1 TO 3 TABLETS BY MOUTH DAILY WITH LUNCH TABLET, FILM COATED ORAL
Qty: 90 | Refills: 5 | Status: ACTIVE | COMMUNITY

## 2021-01-19 RX ORDER — VALACYCLOVIR HCL 500 MG
TAKE 1 TABLET (500 MG) BY ORAL ROUTE ONCE DAILY TABLET ORAL 1
Qty: 0 | Refills: 0 | Status: ACTIVE | COMMUNITY
Start: 1900-01-01

## 2021-01-19 NOTE — HPI-OTHER HISTORIES
The patient presents on follow-up after initially presenting for Crohn's disease involving the terminal ileum. He had been having diarrhea and abdominal pain since March 2017. He would take Imodium when he had intermittent diarrhea.  The patient stated he had a trip to [place] in Jan. and Feb 2017. He had gotten a parasite in his previous trips but these symptoms were different. He was having loose, watery diarrhea. Some days he could have formed stool. He could go up to 8 BMs in the day. He would usually have 5 BMs daily most in the AM. One pill of Imodium provided relief for the rest of the day. Stress and eating could exacerbate symptoms. He noted he had been under a lot of work stress and wondered if this was IBS. The patient stated that hyoscyamine had provided 50% relief of his abdominal pain. He localized his LLQ pain over his previous surgical scar. He noted seeing a cosmetic surgeon because of the unevenness of the sides of the scar, but they differed any further management to his original surgeon.   Review of his [place] [facility] chart noted an admit from 9/21/15 to 9/28/15 where he was noted to have a SB perforation. He had an exp lap with small bowel resection on 9/21/15. His later course was complicated by two intra-abdominal abscesses subsequently requiring I&D of peritoneal abscess with washout on 10/8/17. The cause of the small bowel perforation was not known. The patient had a colonoscopy on 4/28/17 which noted multiple ulcers in the TI and biopsies were significant for active ileitis. The patient had only taken Tylenol PRN since his resection. He denied NSAID use. He had a repeat colonoscopy on 8/11/17 which noted a few erosions in the TI consistent with ileal Crohn's; biopsies noted active enteritis with ulcer.   On visit 6/02/17, patient noted his bowel habit had improved and his abdominal pain was better after taking Cipro x 10 days. His ANCA/ASCA lab were negative for IBD. On visit 9/21/17, he stated he began having abdominal discomfort 1 x week and non-bloody diarrhea with a fever up to 101F on 9/18/17. He denied any upper GI symptoms with it. He was started on Cipro/Flagyl by Dr. Galvan. Then on visit 9/28/17 he stated his diarrhea had improved. He denied any watery diarrhea. He had 6 BMs before clinic visit with soft stool. He reported having abdominal pain on 9/26/17 with a temperature up to 100.8. He stated he woke up with a fever on 9/27/17 morning which resolved. On visit 10/10/17 he had finished his antibiotic treatment and stated that his BMs had improved with 4-5 BMs with formation but denied watery diarrhea. He also noted intermittent lower abdominal pain.   On visit 12/22/17 he stated he had no issues with diarrhea and denied abdominal pain. He had started the 6-MP 50 mg daily x 1 month and then decreased to 25 mg x 1 month because he had concerns that the 6-MP was increasing his susceptibility to having a cold. On 3/26/18, he stated he was doing well on 6-mp 50 mg daily. He denied significant diarrhea and abdominal pain.   On 6/28/18, the patient mentioned that last week his stomach felt sensitive and he experienced some cramping. He reported taking hyoscamine to help with the abdominal discomfort. He also reported his discomfort could be stress related due to stress from his job as an . The patient mentioned that he was doing well on the 6mp 50mg QD.  He stated he recently went to [facility] for work. During his travel, he experienced abdominal discomfort after eating the airplane food that was served. He also stated having one loose stool but no diarrhea after that. He was scheduled to return to [facility] next week. The patient denied any other GI symptoms.   A colonoscopy on 8/27/18 revealed a single ulcer and focal active enteritis in the terminal ileum.   On 9/24/18, it was discussed with the patient about increasing his 6-mp due to some inflammation still present in the ileum while on 6-mp 50 mg QD. The patient noted having to very rarely use hyoscamine. He denied any other GI symptoms.   On 10/15/18, the patient noted he was doing well on 6- mg QD. He had been experiencing a rash on his face in the past month and wanted to know if it was related to the higher dose of 6-MP. He denied any other GI symptoms.   On 12/5/18, He noted no improvement in his rash on 6-mp 50 mg daily so he discontinued 6-mp. Off for 1-2 weeks he did not have a rash.  He resumed at 50 mg and had a recurrent facial rash.  He had seen his dermatologist when the rash had resolved and had a follow-up the next week after the rash had reoccured.  He also followed with an allergist, Dr. Ángel Chew and he noted he is "allergic to everything" and is on allergy shots. He denied any GI symptoms. He stated that he would use the hyoscyamine maybe once a year.   On 1/16/19, he reported that his facial rash had not returned since his last visit and he was doing well. He saw a dermatologist who had no further recomendation. He denied any GI symptoms.  On 2/27/19, he said that starting Friday he had bloating, diarrhea, loss of appetite, and abdominal pain and these symptoms were persisting. He traveled to [facility] Wednesday and returned Friday night when he started feeling worse. He noted he had occasional nausea as well. He had not felt sick after previous trips to [facility]. He stopped taking 6-MP because he got a rash. He said he had taken prednisone in the past and believed he tolerated it.   On 3/20/19, he said he was having 1-2 BMs/day with occasional watery diarrhea. On Monday and Tuesday he noted having nausea and mild periumbilical pain. He took hyoscyamine SL 0.125 mg PRN which provided relief. He took ondansetron on Monday and said it worked for a while, but had to take it again a few hours later. He took Cipro 500 mg for 7 days continues on prednisone 20 mg QD and said this regimen had helped. He was going to start on Humira injections on Friday.  On 4/10/19, he said he had started Humira and denied having a reaction to it. He noted decreased BM frequency with 4-5 soft/loose BMs/day. He denied abdominal pain and said he was eating ok. He noted taking prednisone 20 mg QD had affected his sleep and made him more irritable. He also noted that the rash on his face came back when he reduced the prednisone to 10 mg daily on his own.  It then resolved when he went back up to 20 mg daily.  On 5/1/19, he said he was doing well. He was still tapering off prednisone and was on 5 mg QD. He noted 2-3 soft, not watery BMs QD. He denied abdominal pain and his appetite was good. He had not needed any hyoscyamine. He noted his face rash was slightly noticeable.  On 8/6/19, he said his facial rash was better after using a topical medicine from his dermatologist. He was getting Humira every 2 weeks. He noted episodes every 3 weeks that would last for 3 days with 5-6 watery BMs/day, or some days he would have no BM. He was off prednisone. He still took trazodone for insomnia which helped him sleep.   On 11/12/19, he was taking Welchol 625 mg 1 pill QD. He took it at lunch or dinner. He noted a benefit. He had 2-3 BMs/day. They were formed 80% of the time.  He hadn't had any recent lab work with Dr. Escamilla. His physical was at the end of December. Recently, he had traveled to places like [place], [facility], and [place] [facility].   On 9/23/20, he said he had a flare-up a couple weeks ago where he was having 6 BMs QAM. He was having frequent formed stools with some LLQ pain. He usually had 2-3 formed or soft BMs/day without blood. He continued on Humira every 2 weeks (injects into his thigh).  On 12/8/20, he said he was in the middle of a "flare-up." He had 4-5 medium formed BMs/day with lower abdominal pain and urgency. Pain resolved after a BM he stated.  He denied watery diarrhea and he denied rectal bleeding. He was on Colesevelam 2 pills/day with lunch 3-4x/week - which did help to form his BMs. He had his adalimumab+ab levels checked last week - lab pending. He took trazadone 100 mg (cuts 150 mg pill) for insomnia.  Today, he says he has 1 formed BM/day without blood.  He denies abdominal pain.  He has started on amitriptyline 10 mg QHS and is now off Welchol/colesevelam. He continues on trazadone 1/2 pill QD and is trying to find the right dosage of amitriptyline - either 10 or 20 mg at bedtime. His facial seborrhea has resolved.  Labs 12/2/20 - Adalimumab level 8.7 and antibody 74. 9/23/20 - Tb negative. CBC, CMP all normal.   3/29/19 - CBC, CMP, Lipids, B12, TSH, Vitamin D all normal. 2/27/19 - GI stool panel negative. CBC, CMP, and Vitamin B12 normal.  HCV Ab, HBsAG, and Quant Gold TB all negative. CRP 10.8, Hep B surface Ab reactive.  10/15/18 - CBC normal, CMP normal, 6-, 6-MMPN 2361 on 6-mp 100 mg daily. 6/28/18- CBC normal, CMP normal.  3/26/18- CBC normal, CMP normal, 6TG 84, 6MMPN 132 on 6-mp 50 mg daily.  12/22/17 - CBC/CMP normal. CRP 0.6.   9/28/17 CBC/CMP normal, CRP 52, sed rate 8.   9/2/17 Stool campylobacter/shiga toxin negative, C diff cx and toxin A/B negative, O&Px1 negative.  Stool salmonella/shigella pending. 9/6/17 [facility] - negative.   8/31/17 TPMT 23.7.   4/11/17 GI pathogen stool study negative, but does not test for Yersenia.   4/11/17 TSH/FT4 normal, CBC normal, CMP normal except for a sodium of 145.  ttg IgA negative, IgA level normal, sed rate 2/normal, CRP normal, lipase 47/normal.

## 2021-02-08 ENCOUNTER — ERX REFILL RESPONSE (OUTPATIENT)
Dept: URBAN - METROPOLITAN AREA CLINIC 105 | Facility: CLINIC | Age: 61
End: 2021-02-08

## 2021-02-08 RX ORDER — AMITRIPTYLINE HYDROCHLORIDE 10 MG/1
1 -2 TABLETS TABLET, FILM COATED ORAL
Qty: 180 | Refills: 3

## 2021-03-17 ENCOUNTER — ERX REFILL RESPONSE (OUTPATIENT)
Dept: URBAN - METROPOLITAN AREA CLINIC 105 | Facility: CLINIC | Age: 61
End: 2021-03-17

## 2021-03-17 RX ORDER — ADALIMUMAB 40MG/0.4ML
INJECT 1 PEN UNDER THE SKIN EVERY 2 WEEKS IN ABDOMEN OR THIGH KIT SUBCUTANEOUS
Qty: 1 | Refills: 11

## 2021-05-04 ENCOUNTER — OFFICE VISIT (OUTPATIENT)
Dept: URBAN - METROPOLITAN AREA CLINIC 105 | Facility: CLINIC | Age: 61
End: 2021-05-04
Payer: COMMERCIAL

## 2021-05-04 DIAGNOSIS — Z86.010 ADENOMAS PERSONAL HISTORY OF COLONIC POLYPS: ICD-10-CM

## 2021-05-04 DIAGNOSIS — K58.9 COLON SPASM: ICD-10-CM

## 2021-05-04 DIAGNOSIS — R19.7 ACUTE DIARRHEA: ICD-10-CM

## 2021-05-04 DIAGNOSIS — K50.80 CROHN'S COLITIS: ICD-10-CM

## 2021-05-04 PROCEDURE — 99214 OFFICE O/P EST MOD 30 MIN: CPT | Performed by: INTERNAL MEDICINE

## 2021-05-04 RX ORDER — LISINOPRIL AND HYDROCHLOROTHIAZIDE TABLETS 20; 25 MG/1; MG/1
TAKE 1/2  TABLET BY ORAL ROUTE ONCE DAILY TABLET ORAL 1
Qty: 0 | Refills: 0 | Status: ACTIVE | COMMUNITY
Start: 1900-01-01

## 2021-05-04 RX ORDER — BUPROPION HCL 300 MG
TAKE 1 TABLET (300 MG) BY ORAL ROUTE ONCE DAILY TABLET, EXTENDED RELEASE 24 HR ORAL 1
Qty: 0 | Refills: 0 | Status: ACTIVE | COMMUNITY
Start: 1900-01-01

## 2021-05-04 RX ORDER — VALACYCLOVIR HCL 500 MG
TAKE 1 TABLET (500 MG) BY ORAL ROUTE ONCE DAILY TABLET ORAL 1
Qty: 0 | Refills: 0 | Status: ACTIVE | COMMUNITY
Start: 1900-01-01

## 2021-05-04 RX ORDER — CETIRIZINE HYDROCHLORIDE 10 MG/1
TAKE 1 TABLET (10 MG) BY ORAL ROUTE ONCE DAILY TABLET, FILM COATED ORAL 1
Qty: 0 | Refills: 0 | Status: ACTIVE | COMMUNITY
Start: 1900-01-01

## 2021-05-04 RX ORDER — FINASTERIDE 5 MG/1
TAKE 1 TABLET (5 MG) BY ORAL ROUTE ONCE DAILY TABLET, FILM COATED ORAL 1
Qty: 0 | Refills: 0 | Status: ACTIVE | COMMUNITY
Start: 1900-01-01

## 2021-05-04 RX ORDER — FLUTICASONE PROPIONATE 50 UG/1
SPRAY 1 SPRAY (50 MCG) IN EACH NOSTRIL BY INTRANASAL ROUTE ONCE DAILY SPRAY, METERED NASAL 1
Qty: 1 | Refills: 0 | Status: ACTIVE | COMMUNITY
Start: 1900-01-01

## 2021-05-04 RX ORDER — COLESEVELAM 625 MG/1
TAKE 1 TO 3 TABLETS BY MOUTH DAILY WITH LUNCH TABLET, FILM COATED ORAL
Qty: 90 | Refills: 5 | Status: ACTIVE | COMMUNITY

## 2021-05-04 RX ORDER — ROSUVASTATIN CALCIUM 40 MG/1
TAKE 1 TABLET (40 MG) BY ORAL ROUTE ONCE DAILY TABLET, FILM COATED ORAL 1
Qty: 0 | Refills: 0 | Status: ACTIVE | COMMUNITY
Start: 1900-01-01

## 2021-05-04 RX ORDER — AMITRIPTYLINE HYDROCHLORIDE 10 MG/1
1 -2 TABLETS TABLET, FILM COATED ORAL
Qty: 180 | Refills: 3 | Status: ACTIVE | COMMUNITY

## 2021-05-04 RX ORDER — PROPRANOLOL HYDROCHLORIDE 10 MG/1
TAKE 1 TABLET (10 MG) BY ORAL ROUTE  ONCE PER DAY TABLET ORAL
Qty: 0 | Refills: 0 | Status: ACTIVE | COMMUNITY
Start: 1900-01-01

## 2021-05-04 RX ORDER — ADALIMUMAB 40MG/0.4ML
INJECT 1 PEN UNDER THE SKIN EVERY 2 WEEKS IN ABDOMEN OR THIGH KIT SUBCUTANEOUS
Qty: 1 | Refills: 11 | Status: ACTIVE | COMMUNITY

## 2021-05-04 NOTE — HPI-OTHER HISTORIES
The patient presents on follow-up after initially presenting for Crohn's disease involving the terminal ileum. He had been having diarrhea and abdominal pain since March 2017. He would take Imodium when he had intermittent diarrhea.  The patient stated he had a trip to [place] in Jan. and Feb 2017. He had gotten a parasite in his previous trips but these symptoms were different. He was having loose, watery diarrhea. Some days he could have formed stool. He could go up to 8 BMs in the day. He would usually have 5 BMs daily most in the AM. One pill of Imodium provided relief for the rest of the day. Stress and eating could exacerbate symptoms. He noted he had been under a lot of work stress and wondered if this was IBS. The patient stated that hyoscyamine had provided 50% relief of his abdominal pain. He localized his LLQ pain over his previous surgical scar. He noted seeing a cosmetic surgeon because of the unevenness of the sides of the scar, but they differed any further management to his original surgeon.   Review of his [place] EPIC chart noted an admit from 9/21/15 to 9/28/15 where he was noted to have a SB perforation. He had an exp lap with small bowel resection on 9/21/15. His later course was complicated by two intra-abdominal abscesses subsequently requiring IandD of peritoneal abscess with washout on 10/8/17. The cause of the small bowel perforation was not known. The patient had a colonoscopy on 4/28/17 which noted multiple ulcers in the TI and biopsies were significant for active ileitis. The patient had only taken Tylenol PRN since his resection. He denied NSAID use. He had a repeat colonoscopy on 8/11/17 which noted a few erosions in the TI consistent with ileal Crohn's; biopsies noted active enteritis with ulcer.   On visit 6/02/17, patient noted his bowel habit had improved and his abdominal pain was better after taking Cipro x 10 days. His ANCA/ASCA lab were negative for IBD. On visit 9/21/17, he stated he began having abdominal discomfort 1 x week and non-bloody diarrhea with a fever up to 101F on 9/18/17. He denied any upper GI symptoms with it. He was started on Cipro/Flagyl by Dr. Galvan. Then on visit 9/28/17 he stated his diarrhea had improved. He denied any watery diarrhea. He had 6 BMs before clinic visit with soft stool. He reported having abdominal pain on 9/26/17 with a temperature up to 100.8. He stated he woke up with a fever on 9/27/17 morning which resolved. On visit 10/10/17 he had finished his antibiotic treatment and stated that his BMs had improved with 4-5 BMs with formation but denied watery diarrhea. He also noted intermittent lower abdominal pain.   On visit 12/22/17 he stated he had no issues with diarrhea and denied abdominal pain. He had started the 6-MP 50 mg daily x 1 month and then decreased to 25 mg x 1 month because he had concerns that the 6-MP was increasing his susceptibility to having a cold. On 3/26/18, he stated he was doing well on 6-mp 50 mg daily. He denied significant diarrhea and abdominal pain.   On 6/28/18, the patient mentioned that last week his stomach felt sensitive and he experienced some cramping. He reported taking hyoscamine to help with the abdominal discomfort. He also reported his discomfort could be stress related due to stress from his job as an . The patient mentioned that he was doing well on the 6mp 50mg QD.  He stated he recently went to [facility] for work. During his travel, he experienced abdominal discomfort after eating the airplane food that was served. He also stated having one loose stool but no diarrhea after that. He was scheduled to return to [facility] next week. The patient denied any other GI symptoms.   A colonoscopy on 8/27/18 revealed a single ulcer and focal active enteritis in the terminal ileum.   On 9/24/18, it was discussed with the patient about increasing his 6-mp due to some inflammation still present in the ileum while on 6-mp 50 mg QD. The patient noted having to very rarely use hyoscamine. He denied any other GI symptoms.   On 10/15/18, the patient noted he was doing well on 6- mg QD. He had been experiencing a rash on his face in the past month and wanted to know if it was related to the higher dose of 6-MP. He denied any other GI symptoms.   On 12/5/18, He noted no improvement in his rash on 6-mp 50 mg daily so he discontinued 6-mp. Off for 1-2 weeks he did not have a rash.  He resumed at 50 mg and had a recurrent facial rash.  He had seen his dermatologist when the rash had resolved and had a follow-up the next week after the rash had reoccured.  He also followed with an allergist, Dr. Ángel Chew and he noted he is "allergic to everything" and is on allergy shots. He denied any GI symptoms. He stated that he would use the hyoscyamine maybe once a year.   On 1/16/19, he reported that his facial rash had not returned since his last visit and he was doing well. He saw a dermatologist who had no further recomendation. He denied any GI symptoms.  On 2/27/19, he said that starting Friday he had bloating, diarrhea, loss of appetite, and abdominal pain and these symptoms were persisting. He traveled to [facility] Wednesday and returned Friday night when he started feeling worse. He noted he had occasional nausea as well. He had not felt sick after previous trips to [facility]. He stopped taking 6-MP because he got a rash. He said he had taken prednisone in the past and believed he tolerated it.   On 3/20/19, he said he was having 1-2 BMs/day with occasional watery diarrhea. On Monday and Tuesday he noted having nausea and mild periumbilical pain. He took hyoscyamine SL 0.125 mg PRN which provided relief. He took ondansetron on Monday and said it worked for a while, but had to take it again a few hours later. He took Cipro 500 mg for 7 days continues on prednisone 20 mg QD and said this regimen had helped. He was going to start on Humira injections on Friday.  On 4/10/19, he said he had started Humira and denied having a reaction to it. He noted decreased BM frequency with 4-5 soft/loose BMs/day. He denied abdominal pain and said he was eating ok. He noted taking prednisone 20 mg QD had affected his sleep and made him more irritable. He also noted that the rash on his face came back when he reduced the prednisone to 10 mg daily on his own.  It then resolved when he went back up to 20 mg daily.  On 5/1/19, he said he was doing well. He was still tapering off prednisone and was on 5 mg QD. He noted 2-3 soft, not watery BMs QD. He denied abdominal pain and his appetite was good. He had not needed any hyoscyamine. He noted his face rash was slightly noticeable.  On 8/6/19, he said his facial rash was better after using a topical medicine from his dermatologist. He was getting Humira every 2 weeks. He noted episodes every 3 weeks that would last for 3 days with 5-6 watery BMs/day, or some days he would have no BM. He was off prednisone. He still took trazodone for insomnia which helped him sleep.   On 11/12/19, he was taking Welchol 625 mg 1 pill QD. He took it at lunch or dinner. He noted a benefit. He had 2-3 BMs/day. They were formed 80% of the time.  He hadn't had any recent lab work with Dr. Escamilla. His physical was at the end of December. Recently, he had traveled to places like [place], [facility], and [place] [facility].   On 9/23/20, he said he had a flare-up a couple weeks ago where he was having 6 BMs QAM. He was having frequent formed stools with some LLQ pain. He usually had 2-3 formed or soft BMs/day without blood. He continued on Humira every 2 weeks (injects into his thigh).  On 12/8/20, he said he was in the middle of a "flare-up." He had 4-5 medium formed BMs/day with lower abdominal pain and urgency. Pain resolved after a BM he stated.  He denied watery diarrhea and he denied rectal bleeding. He was on Colesevelam 2 pills/day with lunch 3-4x/week - which did help to form his BMs. He had his adalimumab+ab levels checked last week - lab pending. He took trazadone 100 mg (cuts 150 mg pill) for insomnia.  On 1/19/21, he said he had 1 formed BM/day without blood. He denied abdominal pain.  He had started on amitriptyline 10 mg QHS and was now off Welchol/colesevelam. He continued on trazadone 1/2 pill QD and was trying to find the right dosage of amitriptyline - either 10 or 20 mg at bedtime. His facial seborrhea had resolved.  Today, he says he continues on Humira every 2 weeks. He continues to have 1 BM/day without blood. He only had intermittent minor lower abdominal pain at his incision site last week. Pain is now resolved. He has increased to amitriptyline 10 mg 2 pills QHS. He is sleeping well on this medication. He continues on trazadone 0.5 pill QHS. He has not had a recurrence of facial seborrhea.   Labs 12/2/20 - Adalimumab level 8.7 and antibody 74. 9/23/20 - Tb negative. CBC, CMP all normal.   3/29/19 - CBC, CMP, Lipids, B12, TSH, Vitamin D all normal. 2/27/19 - GI stool panel negative. CBC, CMP, and Vitamin B12 normal.  HCV Ab, HBsAG, and Quant Gold TB all negative. CRP 10.8, Hep B surface Ab reactive.  10/15/18 - CBC normal, CMP normal, 6-, 6-MMPN 2361 on 6-mp 100 mg daily. 6/28/18- CBC normal, CMP normal.  3/26/18- CBC normal, CMP normal, 6TG 84, 6MMPN 132 on 6-mp 50 mg daily.  12/22/17 - CBC/CMP normal. CRP 0.6.   9/28/17 CBC/CMP normal, CRP 52, sed rate 8.   9/2/17 Stool campylobacter/shiga toxin negative, C diff cx and toxin A/B negative, OandPx1 negative.  Stool salmonella/shigella pending. 9/6/17 [facility] - negative.   8/31/17 TPMT 23.7.   4/11/17 GI pathogen stool study negative, but does not test for Yersenia.   4/11/17 TSH/FT4 normal, CBC normal, CMP normal except for a sodium of 145.  ttg IgA negative, IgA level normal, sed rate 2/normal, CRP normal, lipase 47/normal.

## 2021-05-05 LAB
A/G RATIO: 1.9
ALBUMIN: 4.2
ALKALINE PHOSPHATASE: 64
ALT (SGPT): 19
AST (SGOT): 20
BASO (ABSOLUTE): 0
BASOS: 1
BILIRUBIN, TOTAL: 0.3
BUN/CREATININE RATIO: 21
BUN: 19
CALCIUM: 9.3
CARBON DIOXIDE, TOTAL: 25
CHLORIDE: 103
CREATININE: 0.92
EGFR IF AFRICN AM: 104
EGFR IF NONAFRICN AM: 90
EOS (ABSOLUTE): 0.1
EOS: 2
GLOBULIN, TOTAL: 2.2
GLUCOSE: 99
HEMATOCRIT: 44.4
HEMATOLOGY COMMENTS:: (no result)
HEMOGLOBIN: 14.8
IMMATURE CELLS: (no result)
IMMATURE GRANS (ABS): 0
IMMATURE GRANULOCYTES: 0
LYMPHS (ABSOLUTE): 2.6
LYMPHS: 49
MCH: 30.3
MCHC: 33.3
MCV: 91
MONOCYTES(ABSOLUTE): 0.5
MONOCYTES: 10
NEUTROPHILS (ABSOLUTE): 2
NEUTROPHILS: 38
NRBC: (no result)
PLATELETS: 181
POTASSIUM: 4.4
PROTEIN, TOTAL: 6.4
RBC: 4.88
RDW: 12.5
SODIUM: 139
WBC: 5.3

## 2021-07-30 ENCOUNTER — TELEPHONE ENCOUNTER (OUTPATIENT)
Dept: URBAN - METROPOLITAN AREA CLINIC 92 | Facility: CLINIC | Age: 61
End: 2021-07-30

## 2021-07-30 RX ORDER — AMITRIPTYLINE HYDROCHLORIDE 10 MG/1
1 -2 TABLETS TABLET, FILM COATED ORAL
Qty: 180 | Refills: 3

## 2021-07-30 RX ORDER — COLESEVELAM 625 MG/1
TAKE 1 TO 3 TABLETS BY MOUTH DAILY WITH LUNCH TABLET, FILM COATED ORAL
Qty: 90 | Refills: 0

## 2021-09-24 ENCOUNTER — ERX REFILL RESPONSE (OUTPATIENT)
Dept: URBAN - METROPOLITAN AREA CLINIC 105 | Facility: CLINIC | Age: 61
End: 2021-09-24

## 2021-09-24 RX ORDER — COLESEVELAM 625 MG/1
TAKE 1 TO 3 TABLETS BY MOUTH DAILY WITH LUNCH TABLET, FILM COATED ORAL
Qty: 90 | Refills: 5 | OUTPATIENT

## 2021-09-24 RX ORDER — COLESEVELAM 625 MG/1
TAKE 1 TO 3 TABLETS BY MOUTH DAILY WITH LUNCH TABLET, FILM COATED ORAL
Qty: 90 TABLET | Refills: 12 | OUTPATIENT

## 2021-11-02 ENCOUNTER — OFFICE VISIT (OUTPATIENT)
Dept: URBAN - METROPOLITAN AREA CLINIC 105 | Facility: CLINIC | Age: 61
End: 2021-11-02
Payer: COMMERCIAL

## 2021-11-02 VITALS
HEIGHT: 71 IN | HEART RATE: 96 BPM | TEMPERATURE: 97.2 F | BODY MASS INDEX: 30.88 KG/M2 | SYSTOLIC BLOOD PRESSURE: 144 MMHG | WEIGHT: 220.6 LBS | DIASTOLIC BLOOD PRESSURE: 98 MMHG

## 2021-11-02 DIAGNOSIS — Z86.010 PERSONAL HISTORY OF COLON POLYPS: ICD-10-CM

## 2021-11-02 DIAGNOSIS — L21.9 SEBORRHEA: ICD-10-CM

## 2021-11-02 DIAGNOSIS — K50.00 CROHN'S DISEASE INVOLVING TERMINAL ILEUM: ICD-10-CM

## 2021-11-02 DIAGNOSIS — K58.9 IBS (IRRITABLE BOWEL SYNDROME): ICD-10-CM

## 2021-11-02 DIAGNOSIS — R19.7 DIARRHEA: ICD-10-CM

## 2021-11-02 PROCEDURE — 99214 OFFICE O/P EST MOD 30 MIN: CPT | Performed by: INTERNAL MEDICINE

## 2021-11-02 RX ORDER — VALACYCLOVIR HCL 500 MG
TAKE 1 TABLET (500 MG) BY ORAL ROUTE ONCE DAILY TABLET ORAL 1
Qty: 0 | Refills: 0 | Status: ACTIVE | COMMUNITY
Start: 1900-01-01

## 2021-11-02 RX ORDER — CETIRIZINE HYDROCHLORIDE 10 MG/1
TAKE 1 TABLET (10 MG) BY ORAL ROUTE ONCE DAILY TABLET, FILM COATED ORAL 1
Qty: 0 | Refills: 0 | Status: ACTIVE | COMMUNITY
Start: 1900-01-01

## 2021-11-02 RX ORDER — PROPRANOLOL HYDROCHLORIDE 10 MG/1
TAKE 1 TABLET (10 MG) BY ORAL ROUTE  ONCE PER DAY TABLET ORAL
Qty: 0 | Refills: 0 | Status: ACTIVE | COMMUNITY
Start: 1900-01-01

## 2021-11-02 RX ORDER — ROSUVASTATIN CALCIUM 40 MG/1
TAKE 1 TABLET (40 MG) BY ORAL ROUTE ONCE DAILY TABLET, FILM COATED ORAL 1
Qty: 0 | Refills: 0 | Status: ACTIVE | COMMUNITY
Start: 1900-01-01

## 2021-11-02 RX ORDER — LISINOPRIL AND HYDROCHLOROTHIAZIDE TABLETS 20; 25 MG/1; MG/1
TAKE 1/2  TABLET BY ORAL ROUTE ONCE DAILY TABLET ORAL 1
Qty: 0 | Refills: 0 | Status: ACTIVE | COMMUNITY
Start: 1900-01-01

## 2021-11-02 RX ORDER — COLESEVELAM 625 MG/1
TAKE 1 TO 3 TABLETS BY MOUTH DAILY WITH LUNCH TABLET, FILM COATED ORAL
Qty: 90 TABLET | Refills: 12 | Status: ACTIVE | COMMUNITY

## 2021-11-02 RX ORDER — AMITRIPTYLINE HYDROCHLORIDE 10 MG/1
1 -2 TABLETS TABLET, FILM COATED ORAL
Qty: 180 | Refills: 3 | Status: ACTIVE | COMMUNITY

## 2021-11-02 RX ORDER — ADALIMUMAB 40MG/0.4ML
INJECT 1 PEN UNDER THE SKIN EVERY 2 WEEKS IN ABDOMEN OR THIGH KIT SUBCUTANEOUS
Qty: 1 | Refills: 11 | Status: ACTIVE | COMMUNITY

## 2021-11-02 RX ORDER — FINASTERIDE 5 MG/1
TAKE 1 TABLET (5 MG) BY ORAL ROUTE ONCE DAILY TABLET, FILM COATED ORAL 1
Qty: 0 | Refills: 0 | Status: ACTIVE | COMMUNITY
Start: 1900-01-01

## 2021-11-02 RX ORDER — BUPROPION HCL 300 MG
TAKE 1 TABLET (300 MG) BY ORAL ROUTE ONCE DAILY TABLET, EXTENDED RELEASE 24 HR ORAL 1
Qty: 0 | Refills: 0 | Status: ACTIVE | COMMUNITY
Start: 1900-01-01

## 2021-11-02 RX ORDER — FLUTICASONE PROPIONATE 50 UG/1
SPRAY 1 SPRAY (50 MCG) IN EACH NOSTRIL BY INTRANASAL ROUTE ONCE DAILY SPRAY, METERED NASAL 1
Qty: 1 | Refills: 0 | Status: ACTIVE | COMMUNITY
Start: 1900-01-01

## 2021-11-02 NOTE — HPI-OTHER HISTORIES
The patient presents on follow-up after initially presenting for Crohn's disease involving the terminal ileum. He had been having diarrhea and abdominal pain since March 2017. He would take Imodium when he had intermittent diarrhea.  The patient stated he had a trip to [place] in Jan. and Feb 2017. He had gotten a parasite in his previous trips but these symptoms were different. He was having loose, watery diarrhea. Some days he could have formed stool. He could go up to 8 BMs in the day. He would usually have 5 BMs daily most in the AM. One pill of Imodium provided relief for the rest of the day. Stress and eating could exacerbate symptoms. He noted he had been under a lot of work stress and wondered if this was IBS. The patient stated that hyoscyamine had provided 50% relief of his abdominal pain. He localized his LLQ pain over his previous surgical scar. He noted seeing a cosmetic surgeon because of the unevenness of the sides of the scar, but they differed any further management to his original surgeon.   Review of his [facility] chart noted an admit from 9/21/15 to 9/28/15 where he was noted to have a SB perforation. He had an exp lap with small bowel resection on 9/21/15. His later course was complicated by two intra-abdominal abscesses subsequently requiring IandD of peritoneal abscess with washout on 10/8/17. The cause of the small bowel perforation was not known. The patient had a colonoscopy on 4/28/17 which noted multiple ulcers in the TI and biopsies were significant for active ileitis. The patient had only taken Tylenol PRN since his resection. He denied NSAID use. He had a repeat colonoscopy on 8/11/17 which noted a few erosions in the TI consistent with ileal Crohn's; biopsies noted active enteritis with ulcer.   On visit 6/02/17, patient noted his bowel habit had improved and his abdominal pain was better after taking Cipro x 10 days. His ANCA/ASCA lab were negative for IBD. On visit 9/21/17, he stated he began having abdominal discomfort 1 x week and non-bloody diarrhea with a fever up to 101F on 9/18/17. He denied any upper GI symptoms with it. He was started on Cipro/Flagyl by Dr. Galvan. Then on visit 9/28/17 he stated his diarrhea had improved. He denied any watery diarrhea. He had 6 BMs before clinic visit with soft stool. He reported having abdominal pain on 9/26/17 with a temperature up to 100.8. He stated he woke up with a fever on 9/27/17 morning which resolved. On visit 10/10/17 he had finished his antibiotic treatment and stated that his BMs had improved with 4-5 BMs with formation but denied watery diarrhea. He also noted intermittent lower abdominal pain.   On visit 12/22/17 he stated he had no issues with diarrhea and denied abdominal pain. He had started the 6-MP 50 mg daily x 1 month and then decreased to 25 mg x 1 month because he had concerns that the 6-MP was increasing his susceptibility to having a cold. On 3/26/18, he stated he was doing well on 6-mp 50 mg daily. He denied significant diarrhea and abdominal pain.   On 6/28/18, the patient mentioned that last week his stomach felt sensitive and he experienced some cramping. He reported taking hyoscamine to help with the abdominal discomfort. He also reported his discomfort could be stress related due to stress from his job as an . The patient mentioned that he was doing well on the 6mp 50mg QD.  He stated he recently went to [facility] for work. During his travel, he experienced abdominal discomfort after eating the airplane food that was served. He also stated having one loose stool but no diarrhea after that. He was scheduled to return to [facility] next week. The patient denied any other GI symptoms.   A colonoscopy on 8/27/18 revealed a single ulcer and focal active enteritis in the terminal ileum.   On 9/24/18, it was discussed with the patient about increasing his 6-mp due to some inflammation still present in the ileum while on 6-mp 50 mg QD. The patient noted having to very rarely use hyoscamine. He denied any other GI symptoms.   On 10/15/18, the patient noted he was doing well on 6- mg QD. He had been experiencing a rash on his face in the past month and wanted to know if it was related to the higher dose of 6-MP. He denied any other GI symptoms.   On 12/5/18, He noted no improvement in his rash on 6-mp 50 mg daily so he discontinued 6-mp. Off for 1-2 weeks he did not have a rash.  He resumed at 50 mg and had a recurrent facial rash.  He had seen his dermatologist when the rash had resolved and had a follow-up the next week after the rash had reoccured.  He also followed with an allergist, Dr. Ángel Chew and he noted he is "allergic to everything" and is on allergy shots. He denied any GI symptoms. He stated that he would use the hyoscyamine maybe once a year.   On 1/16/19, he reported that his facial rash had not returned since his last visit and he was doing well. He saw a dermatologist who had no further recomendation. He denied any GI symptoms.  On 2/27/19, he said that starting Friday he had bloating, diarrhea, loss of appetite, and abdominal pain and these symptoms were persisting. He traveled to [facility] Wednesday and returned Friday night when he started feeling worse. He noted he had occasional nausea as well. He had not felt sick after previous trips to [facility]. He stopped taking 6-MP because he got a rash. He said he had taken prednisone in the past and believed he tolerated it.   On 3/20/19, he said he was having 1-2 BMs/day with occasional watery diarrhea. On Monday and Tuesday he noted having nausea and mild periumbilical pain. He took hyoscyamine SL 0.125 mg PRN which provided relief. He took ondansetron on Monday and said it worked for a while, but had to take it again a few hours later. He took Cipro 500 mg for 7 days continues on prednisone 20 mg QD and said this regimen had helped. He was going to start on Humira injections on Friday.  On 4/10/19, he said he had started Humira and denied having a reaction to it. He noted decreased BM frequency with 4-5 soft/loose BMs/day. He denied abdominal pain and said he was eating ok. He noted taking prednisone 20 mg QD had affected his sleep and made him more irritable. He also noted that the rash on his face came back when he reduced the prednisone to 10 mg daily on his own.  It then resolved when he went back up to 20 mg daily.  On 5/1/19, he said he was doing well. He was still tapering off prednisone and was on 5 mg QD. He noted 2-3 soft, not watery BMs QD. He denied abdominal pain and his appetite was good. He had not needed any hyoscyamine. He noted his face rash was slightly noticeable.  On 8/6/19, he said his facial rash was better after using a topical medicine from his dermatologist. He was getting Humira every 2 weeks. He noted episodes every 3 weeks that would last for 3 days with 5-6 watery BMs/day, or some days he would have no BM. He was off prednisone. He still took trazodone for insomnia which helped him sleep.   On 11/12/19, he was taking Welchol 625 mg 1 pill QD. He took it at lunch or dinner. He noted a benefit. He had 2-3 BMs/day. They were formed 80% of the time.  He hadn't had any recent lab work with Dr. Escamilla. His physical was at the end of December. Recently, he had traveled to places like [place], [facility], and [place] [facility].   On 9/23/20, he said he had a flare-up a couple weeks ago where he was having 6 BMs QAM. He was having frequent formed stools with some LLQ pain. He usually had 2-3 formed or soft BMs/day without blood. He continued on Humira every 2 weeks (injects into his thigh).  On 12/8/20, he said he was in the middle of a "flare-up." He had 4-5 medium formed BMs/day with lower abdominal pain and urgency. Pain resolved after a BM he stated.  He denied watery diarrhea and he denied rectal bleeding. He was on Colesevelam 2 pills/day with lunch 3-4x/week - which did help to form his BMs. He had his adalimumab+ab levels checked last week - lab pending. He took trazadone 100 mg (cuts 150 mg pill) for insomnia.  On 1/19/21, he said he had 1 formed BM/day without blood. He denied abdominal pain.  He had started on amitriptyline 10 mg QHS and was now off Welchol/colesevelam. He continued on trazadone 1/2 pill QD and was trying to find the right dosage of amitriptyline - either 10 or 20 mg at bedtime. His facial seborrhea had resolved.  On 5/4/21, he said he continued on Humira every 2 weeks. He continued to have 1 BM/day without blood. He only had intermittent minor lower abdominal pain at his incision site last week. Pain was now resolved. He had increased to amitriptyline 10 mg 2 pills QHS. He was sleeping well on this medication. He continued on trazadone 0.5 pill QHS. He had not had a recurrence of facial seborrhea.  Today, he says he was off Humira for 5-6 weeks due to receiving the COVID booster shot - he received the booster twice until he developed an immune response he states. While off, he noted increased BM frequency, intermittent diarrhea (without blood), gas, and abdominal crampings. He has received 2 injections so far - now has 2 formed BMs QD. He was off it due to getting the COVID booster vaccine - actually received it a second time to see if there was an immune response. He continues on amitriptyline 10 mg 2 pills QHS and trazadone 0.5 pill QD.    Labs 5/4/21 - CBC, CMP all normal. 12/2/20 - Adalimumab level 8.7 and antibody 74. 9/23/20 - Tb negative. CBC, CMP all normal.   3/29/19 - CBC, CMP, Lipids, B12, TSH, Vitamin D all normal. 2/27/19 - GI stool panel negative. CBC, CMP, and Vitamin B12 normal.  HCV Ab, HBsAG, and Quant Gold TB all negative. CRP 10.8, Hep B surface Ab reactive.  10/15/18 - CBC normal, CMP normal, 6-, 6-MMPN 2361 on 6-mp 100 mg daily. 6/28/18- CBC normal, CMP normal.  3/26/18- CBC normal, CMP normal, 6TG 84, 6MMPN 132 on 6-mp 50 mg daily.  12/22/17 - CBC/CMP normal. CRP 0.6.   9/28/17 CBC/CMP normal, CRP 52, sed rate 8.   9/2/17 Stool campylobacter/shiga toxin negative, C diff cx and toxin A/B negative, OandPx1 negative.  Stool salmonella/shigella pending. 9/6/17 [facility] - negative.   8/31/17 TPMT 23.7.   4/11/17 GI pathogen stool study negative, but does not test for Yersenia.   4/11/17 TSH/FT4 normal, CBC normal, CMP normal except for a sodium of 145.  ttg IgA negative, IgA level normal, sed rate 2/normal, CRP normal, lipase 47/normal.

## 2021-11-03 LAB
A/G RATIO: 1.9
ALBUMIN: 4.6
ALKALINE PHOSPHATASE: 83
ALT (SGPT): 25
AST (SGOT): 22
BASO (ABSOLUTE): 0
BASOS: 1
BILIRUBIN, TOTAL: 0.5
BUN/CREATININE RATIO: 15
BUN: 15
CALCIUM: 9.4
CARBON DIOXIDE, TOTAL: 23
CHLORIDE: 101
CREATININE: 0.99
EGFR IF AFRICN AM: 95
EGFR IF NONAFRICN AM: 82
EOS (ABSOLUTE): 0.1
EOS: 2
GLOBULIN, TOTAL: 2.4
GLUCOSE: 97
HEMATOCRIT: 47.2
HEMATOLOGY COMMENTS:: (no result)
HEMOGLOBIN: 15.9
IMMATURE CELLS: (no result)
IMMATURE GRANS (ABS): 0
IMMATURE GRANULOCYTES: 1
LYMPHS (ABSOLUTE): 3.6
LYMPHS: 43
MCH: 29.9
MCHC: 33.7
MCV: 89
MONOCYTES(ABSOLUTE): 0.9
MONOCYTES: 11
NEUTROPHILS (ABSOLUTE): 3.4
NEUTROPHILS: 42
NRBC: (no result)
PLATELETS: 206
POTASSIUM: 4.5
PROTEIN, TOTAL: 7
RBC: 5.32
RDW: 12.3
SODIUM: 137
WBC: 8.1

## 2022-05-03 ENCOUNTER — OFFICE VISIT (OUTPATIENT)
Dept: URBAN - METROPOLITAN AREA CLINIC 105 | Facility: CLINIC | Age: 62
End: 2022-05-03

## 2022-05-26 ENCOUNTER — WEB ENCOUNTER (OUTPATIENT)
Dept: URBAN - METROPOLITAN AREA CLINIC 105 | Facility: CLINIC | Age: 62
End: 2022-05-26

## 2022-07-06 ENCOUNTER — OFFICE VISIT (OUTPATIENT)
Dept: URBAN - METROPOLITAN AREA CLINIC 105 | Facility: CLINIC | Age: 62
End: 2022-07-06
Payer: COMMERCIAL

## 2022-07-06 ENCOUNTER — WEB ENCOUNTER (OUTPATIENT)
Dept: URBAN - METROPOLITAN AREA CLINIC 105 | Facility: CLINIC | Age: 62
End: 2022-07-06

## 2022-07-06 ENCOUNTER — LAB OUTSIDE AN ENCOUNTER (OUTPATIENT)
Dept: URBAN - METROPOLITAN AREA CLINIC 105 | Facility: CLINIC | Age: 62
End: 2022-07-06

## 2022-07-06 VITALS
DIASTOLIC BLOOD PRESSURE: 84 MMHG | SYSTOLIC BLOOD PRESSURE: 140 MMHG | WEIGHT: 219.2 LBS | TEMPERATURE: 97 F | HEART RATE: 82 BPM | BODY MASS INDEX: 30.69 KG/M2 | HEIGHT: 71 IN

## 2022-07-06 DIAGNOSIS — L21.9 SEBORRHEA: ICD-10-CM

## 2022-07-06 DIAGNOSIS — K58.9 IBS (IRRITABLE BOWEL SYNDROME): ICD-10-CM

## 2022-07-06 DIAGNOSIS — R19.7 DIARRHEA: ICD-10-CM

## 2022-07-06 DIAGNOSIS — Z86.010 PERSONAL HISTORY OF COLON POLYPS: ICD-10-CM

## 2022-07-06 DIAGNOSIS — K50.00 CROHN'S DISEASE INVOLVING TERMINAL ILEUM: ICD-10-CM

## 2022-07-06 PROCEDURE — 99214 OFFICE O/P EST MOD 30 MIN: CPT | Performed by: INTERNAL MEDICINE

## 2022-07-06 RX ORDER — FLUTICASONE PROPIONATE 50 UG/1
SPRAY 1 SPRAY (50 MCG) IN EACH NOSTRIL BY INTRANASAL ROUTE ONCE DAILY SPRAY, METERED NASAL 1
Qty: 1 | Refills: 0 | Status: ACTIVE | COMMUNITY
Start: 1900-01-01

## 2022-07-06 RX ORDER — CETIRIZINE HYDROCHLORIDE 10 MG/1
TAKE 1 TABLET (10 MG) BY ORAL ROUTE ONCE DAILY TABLET, FILM COATED ORAL 1
Qty: 0 | Refills: 0 | Status: ACTIVE | COMMUNITY
Start: 1900-01-01

## 2022-07-06 RX ORDER — BUPROPION HCL 300 MG
TAKE 1 TABLET (300 MG) BY ORAL ROUTE ONCE DAILY TABLET, EXTENDED RELEASE 24 HR ORAL 1
Qty: 0 | Refills: 0 | Status: ACTIVE | COMMUNITY
Start: 1900-01-01

## 2022-07-06 RX ORDER — ADALIMUMAB 40MG/0.4ML
INJECT 1 PEN UNDER THE SKIN EVERY 2 WEEKS IN ABDOMEN OR THIGH KIT SUBCUTANEOUS
Qty: 1 | Refills: 11 | Status: ACTIVE | COMMUNITY

## 2022-07-06 RX ORDER — PROPRANOLOL HYDROCHLORIDE 10 MG/1
TAKE 1 TABLET (10 MG) BY ORAL ROUTE  ONCE PER DAY TABLET ORAL
Qty: 0 | Refills: 0 | Status: ACTIVE | COMMUNITY
Start: 1900-01-01

## 2022-07-06 RX ORDER — FINASTERIDE 5 MG/1
TAKE 1 TABLET (5 MG) BY ORAL ROUTE ONCE DAILY TABLET, FILM COATED ORAL 1
Qty: 0 | Refills: 0 | Status: ACTIVE | COMMUNITY
Start: 1900-01-01

## 2022-07-06 RX ORDER — AMITRIPTYLINE HYDROCHLORIDE 10 MG/1
1 -2 TABLETS TABLET, FILM COATED ORAL
Qty: 180 | Refills: 3 | Status: ACTIVE | COMMUNITY

## 2022-07-06 RX ORDER — VALACYCLOVIR HCL 500 MG
TAKE 1 TABLET (500 MG) BY ORAL ROUTE ONCE DAILY TABLET ORAL 1
Qty: 0 | Refills: 0 | Status: ACTIVE | COMMUNITY
Start: 1900-01-01

## 2022-07-06 RX ORDER — COLESEVELAM 625 MG/1
TAKE 1 TO 3 TABLETS BY MOUTH DAILY WITH LUNCH TABLET, FILM COATED ORAL
Qty: 90 TABLET | Refills: 12 | Status: ACTIVE | COMMUNITY

## 2022-07-06 RX ORDER — ROSUVASTATIN CALCIUM 40 MG/1
TAKE 1 TABLET (40 MG) BY ORAL ROUTE ONCE DAILY TABLET, FILM COATED ORAL 1
Qty: 0 | Refills: 0 | Status: ACTIVE | COMMUNITY
Start: 1900-01-01

## 2022-07-06 RX ORDER — LISINOPRIL AND HYDROCHLOROTHIAZIDE TABLETS 20; 25 MG/1; MG/1
TAKE 1/2  TABLET BY ORAL ROUTE ONCE DAILY TABLET ORAL 1
Qty: 0 | Refills: 0 | Status: ACTIVE | COMMUNITY
Start: 1900-01-01

## 2022-07-06 NOTE — HPI-OTHER HISTORIES
PAST MEDICAL HISTORY: The patient stated he had a trip to [place] in Jan. and Feb 2017. He had gotten a parasite in his previous trips but these symptoms were different. He was having loose, watery diarrhea. Some days he could have formed stool. He could go up to 8 BMs in the day. He would usually have 5 BMs daily most in the AM. One pill of Imodium provided relief for the rest of the day. Stress and eating could exacerbate symptoms. He noted he had been under a lot of work stress and wondered if this was IBS. The patient stated that hyoscyamine had provided 50% relief of his abdominal pain. He localized his LLQ pain over his previous surgical scar. He noted seeing a cosmetic surgeon because of the unevenness of the sides of the scar, but they differed any further management to his original surgeon.   Review of his [facility] chart noted an admit from 9/21/15 to 9/28/15 where he was noted to have a SB perforation. He had an exp lap with small bowel resection on 9/21/15. His later course was complicated by two intra-abdominal abscesses subsequently requiring IandD of peritoneal abscess with washout on 10/8/17. The cause of the small bowel perforation was not known. The patient had a colonoscopy on 4/28/17 which noted multiple ulcers in the TI and biopsies were significant for active ileitis. The patient had only taken Tylenol PRN since his resection. He denied NSAID use. He had a repeat colonoscopy on 8/11/17 which noted a few erosions in the TI consistent with ileal Crohn's; biopsies noted active enteritis with ulcer.   On visit 6/02/17, patient noted his bowel habit had improved and his abdominal pain was better after taking Cipro x 10 days. His ANCA/ASCA lab were negative for IBD. On visit 9/21/17, he stated he began having abdominal discomfort 1 x week and non-bloody diarrhea with a fever up to 101F on 9/18/17. He denied any upper GI symptoms with it. He was started on Cipro/Flagyl by Dr. Galvan. Then on visit 9/28/17 he stated his diarrhea had improved. He denied any watery diarrhea. He had 6 BMs before clinic visit with soft stool. He reported having abdominal pain on 9/26/17 with a temperature up to 100.8. He stated he woke up with a fever on 9/27/17 morning which resolved. On visit 10/10/17 he had finished his antibiotic treatment and stated that his BMs had improved with 4-5 BMs with formation but denied watery diarrhea. He also noted intermittent lower abdominal pain.   On visit 12/22/17 he stated he had no issues with diarrhea and denied abdominal pain. He had started the 6-MP 50 mg daily x 1 month and then decreased to 25 mg x 1 month because he had concerns that the 6-MP was increasing his susceptibility to having a cold. On 3/26/18, he stated he was doing well on 6-mp 50 mg daily. He denied significant diarrhea and abdominal pain.   On 6/28/18, the patient mentioned that last week his stomach felt sensitive and he experienced some cramping. He reported taking hyoscamine to help with the abdominal discomfort. He also reported his discomfort could be stress related due to stress from his job as an . The patient mentioned that he was doing well on the 6mp 50mg QD.  He stated he recently went to [facility] for work. During his travel, he experienced abdominal discomfort after eating the airplane food that was served. He also stated having one loose stool but no diarrhea after that. He was scheduled to return to [facility] next week. The patient denied any other GI symptoms.   A colonoscopy on 8/27/18 revealed a single ulcer and focal active enteritis in the terminal ileum.   On 9/24/18, it was discussed with the patient about increasing his 6-mp due to some inflammation still present in the ileum while on 6-mp 50 mg QD. The patient noted having to very rarely use hyoscamine. He denied any other GI symptoms.   On 10/15/18, the patient noted he was doing well on 6- mg QD. He had been experiencing a rash on his face in the past month and wanted to know if it was related to the higher dose of 6-MP. He denied any other GI symptoms.   On 12/5/18, He noted no improvement in his rash on 6-mp 50 mg daily so he discontinued 6-mp. Off for 1-2 weeks he did not have a rash.  He resumed at 50 mg and had a recurrent facial rash.  He had seen his dermatologist when the rash had resolved and had a follow-up the next week after the rash had reoccured.  He also followed with an allergist, Dr. Ángel Chew and he noted he is "allergic to everything" and is on allergy shots. He denied any GI symptoms. He stated that he would use the hyoscyamine maybe once a year.   On 1/16/19, he reported that his facial rash had not returned since his last visit and he was doing well. He saw a dermatologist who had no further recomendation. He denied any GI symptoms.  On 2/27/19, he said that starting Friday he had bloating, diarrhea, loss of appetite, and abdominal pain and these symptoms were persisting. He traveled to [facility] Wednesday and returned Friday night when he started feeling worse. He noted he had occasional nausea as well. He had not felt sick after previous trips to [facility]. He stopped taking 6-MP because he got a rash. He said he had taken prednisone in the past and believed he tolerated it.   On 3/20/19, he said he was having 1-2 BMs/day with occasional watery diarrhea. On Monday and Tuesday he noted having nausea and mild periumbilical pain. He took hyoscyamine SL 0.125 mg PRN which provided relief. He took ondansetron on Monday and said it worked for a while, but had to take it again a few hours later. He took Cipro 500 mg for 7 days continues on prednisone 20 mg QD and said this regimen had helped. He was going to start on Humira injections on Friday.  On 4/10/19, he said he had started Humira and denied having a reaction to it. He noted decreased BM frequency with 4-5 soft/loose BMs/day. He denied abdominal pain and said he was eating ok. He noted taking prednisone 20 mg QD had affected his sleep and made him more irritable. He also noted that the rash on his face came back when he reduced the prednisone to 10 mg daily on his own.  It then resolved when he went back up to 20 mg daily.  On 5/1/19, he said he was doing well. He was still tapering off prednisone and was on 5 mg QD. He noted 2-3 soft, not watery BMs QD. He denied abdominal pain and his appetite was good. He had not needed any hyoscyamine. He noted his face rash was slightly noticeable.  On 8/6/19, he said his facial rash was better after using a topical medicine from his dermatologist. He was getting Humira every 2 weeks. He noted episodes every 3 weeks that would last for 3 days with 5-6 watery BMs/day, or some days he would have no BM. He was off prednisone. He still took trazodone for insomnia which helped him sleep.   On 11/12/19, he was taking Welchol 625 mg 1 pill QD. He took it at lunch or dinner. He noted a benefit. He had 2-3 BMs/day. They were formed 80% of the time.  He hadn't had any recent lab work with Dr. Escamilla. His physical was at the end of December. Recently, he had traveled to places like [place], [place], and [place] [facility].   On 9/23/20, he said he had a flare-up a couple weeks ago where he was having 6 BMs QAM. He was having frequent formed stools with some LLQ pain. He usually had 2-3 formed or soft BMs/day without blood. He continued on Humira every 2 weeks (injects into his thigh).  On 12/8/20, he said he was in the middle of a "flare-up." He had 4-5 medium formed BMs/day with lower abdominal pain and urgency. Pain resolved after a BM he stated.  He denied watery diarrhea and he denied rectal bleeding. He was on Colesevelam 2 pills/day with lunch 3-4x/week - which did help to form his BMs. He had his adalimumab+ab levels checked last week - lab pending. He took trazadone 100 mg (cuts 150 mg pill) for insomnia.  On 1/19/21, he said he had 1 formed BM/day without blood. He denied abdominal pain.  He had started on amitriptyline 10 mg QHS and was now off Welchol/colesevelam. He continued on trazadone 1/2 pill QD and was trying to find the right dosage of amitriptyline - either 10 or 20 mg at bedtime. His facial seborrhea had resolved.  On 5/4/21, he said he continued on Humira every 2 weeks. He continued to have 1 BM/day without blood. He only had intermittent minor lower abdominal pain at his incision site last week. Pain was now resolved. He had increased to amitriptyline 10 mg 2 pills QHS. He was sleeping well on this medication. He continued on trazadone 0.5 pill QHS. He had not had a recurrence of facial seborrhea.  On 11/2/21, he said he was off Humira for 5-6 weeks due to receiving the COVID booster shot - he received the booster twice until he developed an immune response he stated. While off, he noted increased BM frequency, intermittent diarrhea (without blood), gas, and abdominal crampings. He had received 2 injections so far - now had 2 formed BMs QD. He was off it due to getting the COVID booster vaccine - actually received it a second time to see if there was an immune response. He continued on amitriptyline 10 mg 2 pills QHS and trazadone 0.5 pill QD.  HPI: Today, he says he continues on Humira every 2 weeks - no diarrhea, bleeding, or abdominal pain. He continues on amitriptyline 10 mg 2 pills QHS.   He has started a new job and is on new insurance. He was told that Humira would cost $2500/month.  Labs 11/2/21 - CBC, CMP all normal. 5/4/21 - CBC, CMP all normal. 12/2/20 - Adalimumab level 8.7 and antibody 74. 9/23/20 - Tb negative. CBC, CMP all normal.   3/29/19 - CBC, CMP, Lipids, B12, TSH, Vitamin D all normal. 2/27/19 - GI stool panel negative. CBC, CMP, and Vitamin B12 normal.  HCV Ab, HBsAG, and Quant Gold TB all negative. CRP 10.8, Hep B surface Ab reactive.  10/15/18 - CBC normal, CMP normal, 6-, 6-MMPN 2361 on 6-mp 100 mg daily. 6/28/18- CBC normal, CMP normal.  3/26/18- CBC normal, CMP normal, 6TG 84, 6MMPN 132 on 6-mp 50 mg daily.  12/22/17 - CBC/CMP normal. CRP 0.6.   9/28/17 CBC/CMP normal, CRP 52, sed rate 8.   9/2/17 Stool campylobacter/shiga toxin negative, C diff cx and toxin A/B negative, OandPx1 negative.  Stool salmonella/shigella pending. 9/6/17 [facility] - negative.   8/31/17 TPMT 23.7.   4/11/17 GI pathogen stool study negative, but does not test for Yersenia.   4/11/17 TSH/FT4 normal, CBC normal, CMP normal except for a sodium of 145.  ttg IgA negative, IgA level normal, sed rate 2/normal, CRP normal, lipase 47/normal.

## 2022-07-08 LAB
A/G RATIO: 2
ALBUMIN: 4.4
ALKALINE PHOSPHATASE: 74
ALT (SGPT): 19
AST (SGOT): 17
BASO (ABSOLUTE): 0.1
BASOS: 1
BILIRUBIN, TOTAL: 0.4
BUN/CREATININE RATIO: 15
BUN: 15
CALCIUM: 9.8
CARBON DIOXIDE, TOTAL: 23
CHLORIDE: 102
CREATININE: 1
EGFR: 86
EOS (ABSOLUTE): 0.1
EOS: 1
GLOBULIN, TOTAL: 2.2
GLUCOSE: 91
HEMATOCRIT: 49.2
HEMATOLOGY COMMENTS:: (no result)
HEMOGLOBIN: 16
IMMATURE CELLS: (no result)
IMMATURE GRANS (ABS): 0
IMMATURE GRANULOCYTES: 0
LYMPHS (ABSOLUTE): 3.4
LYMPHS: 45
MCH: 29.3
MCHC: 32.5
MCV: 90
MONOCYTES(ABSOLUTE): 0.7
MONOCYTES: 9
NEUTROPHILS (ABSOLUTE): 3.3
NEUTROPHILS: 44
NRBC: (no result)
PLATELETS: 208
POTASSIUM: 4.2
PROTEIN, TOTAL: 6.6
QUANTIFERON CRITERIA: (no result)
QUANTIFERON INCUBATION: (no result)
QUANTIFERON MITOGEN VALUE: >10
QUANTIFERON NIL VALUE: 0.08
QUANTIFERON TB1 AG VALUE: 0.06
QUANTIFERON TB2 AG VALUE: 0.05
QUANTIFERON-TB GOLD PLUS: NEGATIVE
RBC: 5.46
RDW: 13
SODIUM: 139
WBC: 7.5

## 2022-08-23 ENCOUNTER — TELEPHONE ENCOUNTER (OUTPATIENT)
Dept: URBAN - METROPOLITAN AREA CLINIC 105 | Facility: CLINIC | Age: 62
End: 2022-08-23

## 2022-08-23 RX ORDER — AMITRIPTYLINE HYDROCHLORIDE 10 MG/1
1 -2 TABLETS TABLET, FILM COATED ORAL
Qty: 180 | Refills: 3 | OUTPATIENT

## 2022-11-30 ENCOUNTER — RX ONLY (OUTPATIENT)
Age: 62
Setting detail: RX ONLY
End: 2022-11-30

## 2022-11-30 ENCOUNTER — APPOINTMENT (RX ONLY)
Dept: URBAN - METROPOLITAN AREA OTHER 1 | Facility: OTHER | Age: 62
Setting detail: DERMATOLOGY
End: 2022-11-30

## 2022-11-30 PROBLEM — C44.319 BASAL CELL CARCINOMA OF SKIN OF OTHER PARTS OF FACE: Status: ACTIVE | Noted: 2022-11-30

## 2022-11-30 PROCEDURE — 17311 MOHS 1 STAGE H/N/HF/G: CPT

## 2022-11-30 PROCEDURE — ? MOHS SURGERY

## 2022-11-30 PROCEDURE — 13131 CMPLX RPR F/C/C/M/N/AX/G/H/F: CPT

## 2022-11-30 RX ORDER — MUPIROCIN 20 MG/G
OINTMENT TOPICAL
Qty: 22 | Refills: 6 | Status: ERX | COMMUNITY
Start: 2022-11-30

## 2022-11-30 NOTE — PROCEDURE: MOHS SURGERY
Body Location Override (Optional - Billing Will Still Be Based On Selected Body Map Location If Applicable): right medial cheek
Mohs Case Number: 
Date Of Previous Biopsy (Optional): 9-27-22
Previous Accession (Optional): [id number]
Biopsy Photograph Reviewed: Yes
Referring Physician (Optional): Dr. Dorantes
Consent Type: Consent 1 (Standard)
Eye Shield Used: No
Initial Size Of Lesion: 1
Primary Defect Length In Cm (Final Defect Size - Required For Flaps/Grafts): 1.3
Primary Defect Width In Cm (Final Defect Size - Required For Flaps/Grafts): 1.2
Repair Type: Complex Repair
Oculoplastic Surgeon Procedure Text (A): After obtaining clear surgical margins the patient was sent to oculoplastics for surgical repair.  The patient understands they will receive post-surgical care and follow-up from the referring physician's office.
Otolaryngologist Procedure Text (A): After obtaining clear surgical margins the patient was sent to otolaryngology for surgical repair.  The patient understands they will receive post-surgical care and follow-up from the referring physician's office.
Plastic Surgeon Procedure Text (A): After obtaining clear surgical margins the patient was sent to plastics for surgical repair.  The patient understands they will receive post-surgical care and follow-up from the referring physician's office.
Mid-Level Procedure Text (A): After obtaining clear surgical margins the patient was sent to a mid-level provider for surgical repair.  The patient understands they will receive post-surgical care and follow-up from the mid-level provider.
Provider Procedure Text (A): After obtaining clear surgical margins the defect was repaired by another provider.
Asc Procedure Text (A): After obtaining clear surgical margins the patient was sent to an ASC for surgical repair.  The patient understands they will receive post-surgical care and follow-up from the ASC physician.
Simple / Intermediate / Complex Repair - Final Wound Length In Cm: 2
Suturegard Retention Suture: 2-0 Nylon
Retention Suture Bite Size: 3 mm
Length To Time In Minutes Device Was In Place: 10
Distance Of Undermining In Cm (Required): 1.5
Undermining Type: Entire Wound
Debridement Text: The wound edges were debrided prior to proceeding with the closure to facilitate wound healing.
Helical Rim Text: The closure involved the helical rim.
Vermilion Border Text: The closure involved the vermilion border.
Nostril Rim Text: The closure involved the nostril rim.
Retention Suture Text: Retention sutures were placed to support the closure and prevent dehiscence.
Secondary Defect Length In Cm (Required For Flaps): 0
Location Indication Override (Is Already Calculated Based On Selected Body Location): Area M
Area H Indication Text: Tumors in this location are included in Area H (eyelids, eyebrows, nose, lips, chin, ear, pre-auricular, post-auricular, temple, genitalia, hands, feet, ankles and areola).  Tissue conservation is critical in these anatomic locations.
Area M Indication Text: Tumors in this location are included in Area M (cheek, forehead, scalp, neck, jawline and pretibial skin).  Mohs surgery is indicated for tumors in these anatomic locations.
Area L Indication Text: Tumors in this location are included in Area L (trunk and extremities).  Mohs surgery is indicated for larger tumors, or tumors with aggressive histologic features, in these anatomic locations.
Depth Of Tumor Invasion (For Histology): tumor not visualized (deep and peripheral margins are clear of tumor)
Perineural Invasion (For Histology - Be Specific If Possible): absent
Special Stains Stage 1 - Results: Base On Clearance Noted Above
Stage 2: Additional Anesthesia Type: 1% lidocaine with epinephrine
Staging Info: By selecting yes to the question above you will include information on AJCC 8 tumor staging in your Mohs note. Information on tumor staging will be automatically added for SCCs on the head and neck. AJCC 8 includes tumor size, tumor depth, perineural involvement and bone invasion.
Tumor Depth: Less than 6mm from granular layer and no invasion beyond the subcutaneous fat
Was The Patient On Physician Recommended Anticoagulation Therapy?: Please Select the Appropriate Response
Medical Necessity Statement: Based on my medical judgement, Mohs surgery is the most appropriate treatment for this cancer compared to other treatments.
Alternatives Discussed Intro (Do Not Add Period): I discussed alternative treatments to Mohs surgery and specifically discussed the risks and benefits of
Consent 1/Introductory Paragraph: The rationale for Mohs was explained to the patient and consent was obtained. The risks, benefits and alternatives to therapy were discussed in detail. Specifically, the risks of infection, scarring, bleeding, prolonged wound healing, incomplete removal, allergy to anesthesia, nerve injury and recurrence were addressed. Prior to the procedure, the treatment site was clearly identified and confirmed by the patient. All components of Universal Protocol/PAUSE Rule completed.
Consent 2/Introductory Paragraph: Mohs surgery was explained to the patient and consent was obtained. The risks, benefits and alternatives to therapy were discussed in detail. Specifically, the risks of infection, scarring, bleeding, prolonged wound healing, incomplete removal, allergy to anesthesia, nerve injury and recurrence were addressed. Prior to the procedure, the treatment site was clearly identified and confirmed by the patient. All components of Universal Protocol/PAUSE Rule completed.
Consent 3/Introductory Paragraph: I gave the patient a chance to ask questions they had about the procedure.  Following this I explained the Mohs procedure and consent was obtained. The risks, benefits and alternatives to therapy were discussed in detail. Specifically, the risks of infection, scarring, bleeding, prolonged wound healing, incomplete removal, allergy to anesthesia, nerve injury and recurrence were addressed. Prior to the procedure, the treatment site was clearly identified and confirmed by the patient. All components of Universal Protocol/PAUSE Rule completed.
Consent (Temporal Branch)/Introductory Paragraph: The rationale for Mohs was explained to the patient and consent was obtained. The risks, benefits and alternatives to therapy were discussed in detail. Specifically, the risks of damage to the temporal branch of the facial nerve, infection, scarring, bleeding, prolonged wound healing, incomplete removal, allergy to anesthesia, and recurrence were addressed. Prior to the procedure, the treatment site was clearly identified and confirmed by the patient. All components of Universal Protocol/PAUSE Rule completed.
Consent (Marginal Mandibular)/Introductory Paragraph: The rationale for Mohs was explained to the patient and consent was obtained. The risks, benefits and alternatives to therapy were discussed in detail. Specifically, the risks of damage to the marginal mandibular branch of the facial nerve, infection, scarring, bleeding, prolonged wound healing, incomplete removal, allergy to anesthesia, and recurrence were addressed. Prior to the procedure, the treatment site was clearly identified and confirmed by the patient. All components of Universal Protocol/PAUSE Rule completed.
Consent (Spinal Accessory)/Introductory Paragraph: The rationale for Mohs was explained to the patient and consent was obtained. The risks, benefits and alternatives to therapy were discussed in detail. Specifically, the risks of damage to the spinal accessory nerve, infection, scarring, bleeding, prolonged wound healing, incomplete removal, allergy to anesthesia, and recurrence were addressed. Prior to the procedure, the treatment site was clearly identified and confirmed by the patient. All components of Universal Protocol/PAUSE Rule completed.
Consent (Near Eyelid Margin)/Introductory Paragraph: The rationale for Mohs was explained to the patient and consent was obtained. The risks, benefits and alternatives to therapy were discussed in detail. Specifically, the risks of ectropion or eyelid deformity, infection, scarring, bleeding, prolonged wound healing, incomplete removal, allergy to anesthesia, nerve injury and recurrence were addressed. Prior to the procedure, the treatment site was clearly identified and confirmed by the patient. All components of Universal Protocol/PAUSE Rule completed.
Consent (Ear)/Introductory Paragraph: The rationale for Mohs was explained to the patient and consent was obtained. The risks, benefits and alternatives to therapy were discussed in detail. Specifically, the risks of ear deformity, infection, scarring, bleeding, prolonged wound healing, incomplete removal, allergy to anesthesia, nerve injury and recurrence were addressed. Prior to the procedure, the treatment site was clearly identified and confirmed by the patient. All components of Universal Protocol/PAUSE Rule completed.
Consent (Nose)/Introductory Paragraph: The rationale for Mohs was explained to the patient and consent was obtained. The risks, benefits and alternatives to therapy were discussed in detail. Specifically, the risks of nasal deformity, changes in the flow of air through the nose, infection, scarring, bleeding, prolonged wound healing, incomplete removal, allergy to anesthesia, nerve injury and recurrence were addressed. Prior to the procedure, the treatment site was clearly identified and confirmed by the patient. All components of Universal Protocol/PAUSE Rule completed.
Consent (Lip)/Introductory Paragraph: The rationale for Mohs was explained to the patient and consent was obtained. The risks, benefits and alternatives to therapy were discussed in detail. Specifically, the risks of lip deformity, changes in the oral aperture, infection, scarring, bleeding, prolonged wound healing, incomplete removal, allergy to anesthesia, nerve injury and recurrence were addressed. Prior to the procedure, the treatment site was clearly identified and confirmed by the patient. All components of Universal Protocol/PAUSE Rule completed.
Consent (Scalp)/Introductory Paragraph: The rationale for Mohs was explained to the patient and consent was obtained. The risks, benefits and alternatives to therapy were discussed in detail. Specifically, the risks of changes in hair growth pattern secondary to repair, infection, scarring, bleeding, prolonged wound healing, incomplete removal, allergy to anesthesia, nerve injury and recurrence were addressed. Prior to the procedure, the treatment site was clearly identified and confirmed by the patient. All components of Universal Protocol/PAUSE Rule completed.
Detail Level: Detailed
Postop Diagnosis: same
Surgeon: Dr. Drummond
Anesthesia Volume In Cc: 5
Hemostasis: Electrocautery
Estimated Blood Loss (Cc): minimal
Anesthesia Volume In Cc: 6
Brow Lift Text: A midfrontal incision was made medially to the defect to allow access to the tissues just superior to the left eyebrow. Following careful dissection inferiorly in a supraperiosteal plane to the level of the left eyebrow, several 3-0 monocryl sutures were used to resuspend the eyebrow orbicularis oculi muscular unit to the superior frontal bone periosteum. This resulted in an appropriate reapproximation of static eyebrow symmetry and correction of the left brow ptosis.
Deep Sutures: 5-0 Vicryl
Number Of Deep Sutures (Optional): 4
Epidermal Sutures: 5-0 Prolene
Epidermal Closure: running
Suturegard Intro: Intraoperative tissue expansion was performed, utilizing the SUTUREGARD device, in order to reduce wound tension.
Suturegard Body: The suture ends were repeatedly re-tightened and re-clamped to achieve the desired tissue expansion.
Hemigard Intro: Due to skin fragility and wound tension, it was decided to use HEMIGARD adhesive retention suture devices to permit a linear closure. The skin was cleaned and dried for a 6cm distance away from the wound. Excessive hair, if present, was removed to allow for adhesion.
Hemigard Postcare Instructions: The HEMIGARD strips are to remain completely dry for at least 5-7 days.
Donor Site Anesthesia Type: same as repair anesthesia
Epidermal Closure Graft Donor Site (Optional): simple interrupted
Graft Donor Site Bandage (Optional-Leave Blank If You Don't Want In Note): Steri-strips and a pressure bandage were applied to the donor site.
Closure 2 Information: This tab is for additional flaps and grafts, including complex repair and grafts and complex repair and flaps. You can also specify a different location for the additional defect, if the location is the same you do not need to select a new one. We will insert the automated text for the repair you select below just as we do for solitary flaps and grafts. Please note that at this time if you select a location with a different insurance zone you will need to override the ICD10 and CPT if appropriate.
Closure 3 Information: This tab is for additional flaps and grafts above and beyond our usual structured repairs.  Please note if you enter information here it will not currently bill and you will need to add the billing information manually.
Wound Care: Mupirocin
Dressing: dry sterile dressing
Wound Care (No Sutures): Petrolatum
Suture Removal: 7 days
Unna Boot Text: An Unna boot was placed to help immobilize the limb and facilitate more rapid healing.
Home Suture Removal Text: Patient was provided instructions on removing sutures and will remove their sutures at home.  If they have any questions or difficulties they will call the office.
Post-Care Instructions: I reviewed with the patient in detail post-care instructions. Patient is not to engage in any heavy lifting, exercise, or swimming for the next 14 days. Should the patient develop any fevers, chills, bleeding, severe pain patient will contact the office immediately.
Pain Refusal Text: I offered to prescribe pain medication but the patient refused to take this medication.
Mauc Instructions: By selecting yes to the question below the MAUC number will be added into the note.  This will be calculated automatically based on the diagnosis chosen, the size entered, the body zone selected (H,M,L) and the specific indications you chose. You will also have the option to override the Mohs AUC if you disagree with the automatically calculated number and this option is found in the Case Summary tab.
Where Do You Want The Question To Include Opioid Counseling Located?: Case Summary Tab
Eye Protection Verbiage: Before proceeding with the stage, a plastic scleral shield was inserted. The globe was anesthetized with a few drops of 1% lidocaine with 1:100,000 epinephrine. Then, an appropriate sized scleral shield was chosen and coated with lacrilube ointment. The shield was gently inserted and left in place for the duration of each stage. After the stage was completed, the shield was gently removed.
Mohs Method Verbiage: An incision at a 45 degree angle following the standard Mohs approach was done and the specimen was harvested as a microscopic controlled layer.
Surgeon/Pathologist Verbiage (Will Incorporate Name Of Surgeon From Intro If Not Blank): operated in two distinct and integrated capacities as the surgeon and pathologist.
Mohs Histo Method Verbiage: Each section was then chromacoded and processed in the Mohs lab using the Mohs protocol and submitted for frozen section.
Subsequent Stages Histo Method Verbiage: Using a similar technique to that described above, a thin layer of tissue was removed from all areas where tumor was visible on the previous stage.  The tissue was again oriented, mapped, dyed, and processed as above.
Mohs Rapid Report Verbiage: The area of clinically evident tumor was marked with skin marking ink and appropriately hatched.  The initial incision was made following the Mohs approach through the skin.  The specimen was taken to the lab, divided into the necessary number of pieces, chromacoded and processed according to the Mohs protocol.  This was repeated in successive stages until a tumor free defect was achieved.
Complex Repair Preamble Text (Leave Blank If You Do Not Want): Extensive wide undermining was performed.
Intermediate Repair Preamble Text (Leave Blank If You Do Not Want): Undermining was performed with blunt dissection.
Non-Graft Cartilage Fenestration Text: The cartilage was fenestrated with a 2mm punch biopsy to help facilitate healing.
Graft Cartilage Fenestration Text: The cartilage was fenestrated with a 2mm punch biopsy to help facilitate graft survival and healing.
Secondary Intention Text (Leave Blank If You Do Not Want): The defect will heal with secondary intention.
No Repair - Repaired With Adjacent Surgical Defect Text (Leave Blank If You Do Not Want): After obtaining clear surgical margins the defect was repaired concurrently with another surgical defect which was in close approximation.
Adjacent Tissue Transfer Text: The defect edges were debeveled with a #15 scalpel blade.  Given the location of the defect and the proximity to free margins an adjacent tissue transfer was deemed most appropriate.  Using a sterile surgical marker, an appropriate flap was drawn incorporating the defect and placing the expected incisions within the relaxed skin tension lines where possible.    The area thus outlined was incised deep to adipose tissue with a #15 scalpel blade.  The skin margins were undermined to an appropriate distance in all directions utilizing iris scissors.
Advancement Flap (Single) Text: The defect edges were debeveled with a #15 scalpel blade.  Given the location of the defect and the proximity to free margins a single advancement flap was deemed most appropriate.  Using a sterile surgical marker, an appropriate advancement flap was drawn incorporating the defect and placing the expected incisions within the relaxed skin tension lines where possible.    The area thus outlined was incised deep to adipose tissue with a #15 scalpel blade.  The skin margins were undermined to an appropriate distance in all directions utilizing iris scissors.
Advancement Flap (Double) Text: The defect edges were debeveled with a #15 scalpel blade.  Given the location of the defect and the proximity to free margins a double advancement flap was deemed most appropriate.  Using a sterile surgical marker, the appropriate advancement flaps were drawn incorporating the defect and placing the expected incisions within the relaxed skin tension lines where possible.    The area thus outlined was incised deep to adipose tissue with a #15 scalpel blade.  The skin margins were undermined to an appropriate distance in all directions utilizing iris scissors.
Burow's Advancement Flap Text: The defect edges were debeveled with a #15 scalpel blade.  Given the location of the defect and the proximity to free margins a Burow's advancement flap was deemed most appropriate.  Using a sterile surgical marker, the appropriate advancement flap was drawn incorporating the defect and placing the expected incisions within the relaxed skin tension lines where possible.    The area thus outlined was incised deep to adipose tissue with a #15 scalpel blade.  The skin margins were undermined to an appropriate distance in all directions utilizing iris scissors.
Chonodrocutaneous Helical Advancement Flap Text: The defect edges were debeveled with a #15 scalpel blade.  Given the location of the defect and the proximity to free margins a chondrocutaneous helical advancement flap was deemed most appropriate.  Using a sterile surgical marker, the appropriate advancement flap was drawn incorporating the defect and placing the expected incisions within the relaxed skin tension lines where possible.    The area thus outlined was incised deep to adipose tissue with a #15 scalpel blade.  The skin margins were undermined to an appropriate distance in all directions utilizing iris scissors.
Crescentic Advancement Flap Text: The defect edges were debeveled with a #15 scalpel blade.  Given the location of the defect and the proximity to free margins a crescentic advancement flap was deemed most appropriate.  Using a sterile surgical marker, the appropriate advancement flap was drawn incorporating the defect and placing the expected incisions within the relaxed skin tension lines where possible.    The area thus outlined was incised deep to adipose tissue with a #15 scalpel blade.  The skin margins were undermined to an appropriate distance in all directions utilizing iris scissors.
A-T Advancement Flap Text: The defect edges were debeveled with a #15 scalpel blade.  Given the location of the defect, shape of the defect and the proximity to free margins an A-T advancement flap was deemed most appropriate.  Using a sterile surgical marker, an appropriate advancement flap was drawn incorporating the defect and placing the expected incisions within the relaxed skin tension lines where possible.    The area thus outlined was incised deep to adipose tissue with a #15 scalpel blade.  The skin margins were undermined to an appropriate distance in all directions utilizing iris scissors.
O-T Advancement Flap Text: The defect edges were debeveled with a #15 scalpel blade.  Given the location of the defect, shape of the defect and the proximity to free margins an O-T advancement flap was deemed most appropriate.  Using a sterile surgical marker, an appropriate advancement flap was drawn incorporating the defect and placing the expected incisions within the relaxed skin tension lines where possible.    The area thus outlined was incised deep to adipose tissue with a #15 scalpel blade.  The skin margins were undermined to an appropriate distance in all directions utilizing iris scissors.
O-L Flap Text: The defect edges were debeveled with a #15 scalpel blade.  Given the location of the defect, shape of the defect and the proximity to free margins an O-L flap was deemed most appropriate.  Using a sterile surgical marker, an appropriate advancement flap was drawn incorporating the defect and placing the expected incisions within the relaxed skin tension lines where possible.    The area thus outlined was incised deep to adipose tissue with a #15 scalpel blade.  The skin margins were undermined to an appropriate distance in all directions utilizing iris scissors.
O-Z Flap Text: The defect edges were debeveled with a #15 scalpel blade.  Given the location of the defect, shape of the defect and the proximity to free margins an O-Z flap was deemed most appropriate.  Using a sterile surgical marker, an appropriate transposition flap was drawn incorporating the defect and placing the expected incisions within the relaxed skin tension lines where possible. The area thus outlined was incised deep to adipose tissue with a #15 scalpel blade.  The skin margins were undermined to an appropriate distance in all directions utilizing iris scissors.
Double O-Z Flap Text: The defect edges were debeveled with a #15 scalpel blade.  Given the location of the defect, shape of the defect and the proximity to free margins a Double O-Z flap was deemed most appropriate.  Using a sterile surgical marker, an appropriate transposition flap was drawn incorporating the defect and placing the expected incisions within the relaxed skin tension lines where possible. The area thus outlined was incised deep to adipose tissue with a #15 scalpel blade.  The skin margins were undermined to an appropriate distance in all directions utilizing iris scissors.
V-Y Flap Text: The defect edges were debeveled with a #15 scalpel blade.  Given the location of the defect, shape of the defect and the proximity to free margins a V-Y flap was deemed most appropriate.  Using a sterile surgical marker, an appropriate advancement flap was drawn incorporating the defect and placing the expected incisions within the relaxed skin tension lines where possible.    The area thus outlined was incised deep to adipose tissue with a #15 scalpel blade.  The skin margins were undermined to an appropriate distance in all directions utilizing iris scissors.
Advancement-Rotation Flap Text: The defect edges were debeveled with a #15 scalpel blade.  Given the location of the defect, shape of the defect and the proximity to free margins an advancement-rotation flap was deemed most appropriate.  Using a sterile surgical marker, an appropriate flap was drawn incorporating the defect and placing the expected incisions within the relaxed skin tension lines where possible. The area thus outlined was incised deep to adipose tissue with a #15 scalpel blade.  The skin margins were undermined to an appropriate distance in all directions utilizing iris scissors.
Mercedes Flap Text: The defect edges were debeveled with a #15 scalpel blade.  Given the location of the defect, shape of the defect and the proximity to free margins a Mercedes flap was deemed most appropriate.  Using a sterile surgical marker, an appropriate advancement flap was drawn incorporating the defect and placing the expected incisions within the relaxed skin tension lines where possible. The area thus outlined was incised deep to adipose tissue with a #15 scalpel blade.  The skin margins were undermined to an appropriate distance in all directions utilizing iris scissors.
Modified Advancement Flap Text: The defect edges were debeveled with a #15 scalpel blade.  Given the location of the defect, shape of the defect and the proximity to free margins a modified advancement flap was deemed most appropriate.  Using a sterile surgical marker, an appropriate advancement flap was drawn incorporating the defect and placing the expected incisions within the relaxed skin tension lines where possible.    The area thus outlined was incised deep to adipose tissue with a #15 scalpel blade.  The skin margins were undermined to an appropriate distance in all directions utilizing iris scissors.
Mucosal Advancement Flap Text: Given the location of the defect, shape of the defect and the proximity to free margins a mucosal advancement flap was deemed most appropriate. Incisions were made with a 15 blade scalpel in the appropriate fashion along the cutaneous vermilion border and the mucosal lip. The remaining actinically damaged mucosal tissue was excised.  The mucosal advancement flap was then elevated to the gingival sulcus with care taken to preserve the neurovascular structures and advanced into the primary defect. Care was taken to ensure that precise realignment of the vermilion border was achieved.
Peng Advancement Flap Text: The defect edges were debeveled with a #15 scalpel blade.  Given the location of the defect, shape of the defect and the proximity to free margins a Peng advancement flap was deemed most appropriate.  Using a sterile surgical marker, an appropriate advancement flap was drawn incorporating the defect and placing the expected incisions within the relaxed skin tension lines where possible. The area thus outlined was incised deep to adipose tissue with a #15 scalpel blade.  The skin margins were undermined to an appropriate distance in all directions utilizing iris scissors.
Hatchet Flap Text: The defect edges were debeveled with a #15 scalpel blade.  Given the location of the defect, shape of the defect and the proximity to free margins a hatchet flap was deemed most appropriate.  Using a sterile surgical marker, an appropriate hatchet flap was drawn incorporating the defect and placing the expected incisions within the relaxed skin tension lines where possible.    The area thus outlined was incised deep to adipose tissue with a #15 scalpel blade.  The skin margins were undermined to an appropriate distance in all directions utilizing iris scissors.
Rotation Flap Text: The defect edges were debeveled with a #15 scalpel blade.  Given the location of the defect, shape of the defect and the proximity to free margins a rotation flap was deemed most appropriate.  Using a sterile surgical marker, an appropriate rotation flap was drawn incorporating the defect and placing the expected incisions within the relaxed skin tension lines where possible.    The area thus outlined was incised deep to adipose tissue with a #15 scalpel blade.  The skin margins were undermined to an appropriate distance in all directions utilizing iris scissors.
Spiral Flap Text: The defect edges were debeveled with a #15 scalpel blade.  Given the location of the defect, shape of the defect and the proximity to free margins a spiral flap was deemed most appropriate.  Using a sterile surgical marker, an appropriate rotation flap was drawn incorporating the defect and placing the expected incisions within the relaxed skin tension lines where possible. The area thus outlined was incised deep to adipose tissue with a #15 scalpel blade.  The skin margins were undermined to an appropriate distance in all directions utilizing iris scissors.
Staged Advancement Flap Text: The defect edges were debeveled with a #15 scalpel blade.  Given the location of the defect, shape of the defect and the proximity to free margins a staged advancement flap was deemed most appropriate.  Using a sterile surgical marker, an appropriate advancement flap was drawn incorporating the defect and placing the expected incisions within the relaxed skin tension lines where possible. The area thus outlined was incised deep to adipose tissue with a #15 scalpel blade.  The skin margins were undermined to an appropriate distance in all directions utilizing iris scissors.
Star Wedge Flap Text: The defect edges were debeveled with a #15 scalpel blade.  Given the location of the defect, shape of the defect and the proximity to free margins a star wedge flap was deemed most appropriate.  Using a sterile surgical marker, an appropriate rotation flap was drawn incorporating the defect and placing the expected incisions within the relaxed skin tension lines where possible. The area thus outlined was incised deep to adipose tissue with a #15 scalpel blade.  The skin margins were undermined to an appropriate distance in all directions utilizing iris scissors.
Transposition Flap Text: The defect edges were debeveled with a #15 scalpel blade.  Given the location of the defect and the proximity to free margins a transposition flap was deemed most appropriate.  Using a sterile surgical marker, an appropriate transposition flap was drawn incorporating the defect.    The area thus outlined was incised deep to adipose tissue with a #15 scalpel blade.  The skin margins were undermined to an appropriate distance in all directions utilizing iris scissors.
Muscle Hinge Flap Text: The defect edges were debeveled with a #15 scalpel blade.  Given the size, depth and location of the defect and the proximity to free margins a muscle hinge flap was deemed most appropriate.  Using a sterile surgical marker, an appropriate hinge flap was drawn incorporating the defect. The area thus outlined was incised with a #15 scalpel blade.  The skin margins were undermined to an appropriate distance in all directions utilizing iris scissors.
Mustarde Flap Text: The defect edges were debeveled with a #15 scalpel blade.  Given the size, depth and location of the defect and the proximity to free margins a Mustarde flap was deemed most appropriate.  Using a sterile surgical marker, an appropriate flap was drawn incorporating the defect. The area thus outlined was incised with a #15 scalpel blade.  The skin margins were undermined to an appropriate distance in all directions utilizing iris scissors.
Nasal Turnover Hinge Flap Text: The defect edges were debeveled with a #15 scalpel blade.  Given the size, depth, location of the defect and the defect being full thickness a nasal turnover hinge flap was deemed most appropriate.  Using a sterile surgical marker, an appropriate hinge flap was drawn incorporating the defect. The area thus outlined was incised with a #15 scalpel blade. The flap was designed to recreate the nasal mucosal lining and the alar rim. The skin margins were undermined to an appropriate distance in all directions utilizing iris scissors.
Nasalis-Muscle-Based Myocutaneous Island Pedicle Flap Text: Using a #15 blade, an incision was made around the donor flap to the level of the nasalis muscle. Wide lateral undermining was then performed in both the subcutaneous plane above the nasalis muscle, and in a submuscular plane just above periosteum. This allowed the formation of a free nasalis muscle axial pedicle (based on the angular artery) which was still attached to the actual cutaneous flap, increasing its mobility and vascular viability. Hemostasis was obtained with pinpoint electrocoagulation. The flap was mobilized into position and the pivotal anchor points positioned and stabilized with buried interrupted sutures. Subcutaneous and dermal tissues were closed in a multilayered fashion with sutures. Tissue redundancies were excised, and the epidermal edges were apposed without significant tension and sutured with sutures.
Orbicularis Oris Muscle Flap Text: The defect edges were debeveled with a #15 scalpel blade.  Given that the defect affected the competency of the oral sphincter an orbicularis oris muscle flap was deemed most appropriate to restore this competency and normal muscle function.  Using a sterile surgical marker, an appropriate flap was drawn incorporating the defect. The area thus outlined was incised with a #15 scalpel blade.
Melolabial Transposition Flap Text: The defect edges were debeveled with a #15 scalpel blade.  Given the location of the defect and the proximity to free margins a melolabial flap was deemed most appropriate.  Using a sterile surgical marker, an appropriate melolabial transposition flap was drawn incorporating the defect.    The area thus outlined was incised deep to adipose tissue with a #15 scalpel blade.  The skin margins were undermined to an appropriate distance in all directions utilizing iris scissors.
Rhombic Flap Text: The defect edges were debeveled with a #15 scalpel blade.  Given the location of the defect and the proximity to free margins a rhombic flap was deemed most appropriate.  Using a sterile surgical marker, an appropriate rhombic flap was drawn incorporating the defect.    The area thus outlined was incised deep to adipose tissue with a #15 scalpel blade.  The skin margins were undermined to an appropriate distance in all directions utilizing iris scissors.
Rhomboid Transposition Flap Text: The defect edges were debeveled with a #15 scalpel blade.  Given the location of the defect and the proximity to free margins a rhomboid transposition flap was deemed most appropriate.  Using a sterile surgical marker, an appropriate rhomboid flap was drawn incorporating the defect.    The area thus outlined was incised deep to adipose tissue with a #15 scalpel blade.  The skin margins were undermined to an appropriate distance in all directions utilizing iris scissors.
Bi-Rhombic Flap Text: The defect edges were debeveled with a #15 scalpel blade.  Given the location of the defect and the proximity to free margins a bi-rhombic flap was deemed most appropriate.  Using a sterile surgical marker, an appropriate rhombic flap was drawn incorporating the defect. The area thus outlined was incised deep to adipose tissue with a #15 scalpel blade.  The skin margins were undermined to an appropriate distance in all directions utilizing iris scissors.
Helical Rim Advancement Flap Text: The defect edges were debeveled with a #15 blade scalpel.  Given the location of the defect and the proximity to free margins (helical rim) a double helical rim advancement flap was deemed most appropriate.  Using a sterile surgical marker, the appropriate advancement flaps were drawn incorporating the defect and placing the expected incisions between the helical rim and antihelix where possible.  The area thus outlined was incised through and through with a #15 scalpel blade.  With a skin hook and iris scissors, the flaps were gently and sharply undermined and freed up.
Bilateral Helical Rim Advancement Flap Text: The defect edges were debeveled with a #15 blade scalpel.  Given the location of the defect and the proximity to free margins (helical rim) a bilateral helical rim advancement flap was deemed most appropriate.  Using a sterile surgical marker, the appropriate advancement flaps were drawn incorporating the defect and placing the expected incisions between the helical rim and antihelix where possible.  The area thus outlined was incised through and through with a #15 scalpel blade.  With a skin hook and iris scissors, the flaps were gently and sharply undermined and freed up.
Ear Star Wedge Flap Text: The defect edges were debeveled with a #15 blade scalpel.  Given the location of the defect and the proximity to free margins (helical rim) an ear star wedge flap was deemed most appropriate.  Using a sterile surgical marker, the appropriate flap was drawn incorporating the defect and placing the expected incisions between the helical rim and antihelix where possible.  The area thus outlined was incised through and through with a #15 scalpel blade.
Banner Transposition Flap Text: The defect edges were debeveled with a #15 scalpel blade.  Given the location of the defect and the proximity to free margins a Banner transposition flap was deemed most appropriate.  Using a sterile surgical marker, an appropriate flap drawn around the defect. The area thus outlined was incised deep to adipose tissue with a #15 scalpel blade.  The skin margins were undermined to an appropriate distance in all directions utilizing iris scissors.
Bilobed Flap Text: The defect edges were debeveled with a #15 scalpel blade.  Given the location of the defect and the proximity to free margins a bilobe flap was deemed most appropriate.  Using a sterile surgical marker, an appropriate bilobe flap drawn around the defect.    The area thus outlined was incised deep to adipose tissue with a #15 scalpel blade.  The skin margins were undermined to an appropriate distance in all directions utilizing iris scissors.
Bilobed Transposition Flap Text: The defect edges were debeveled with a #15 scalpel blade.  Given the location of the defect and the proximity to free margins a bilobed transposition flap was deemed most appropriate.  Using a sterile surgical marker, an appropriate bilobe flap drawn around the defect.    The area thus outlined was incised deep to adipose tissue with a #15 scalpel blade.  The skin margins were undermined to an appropriate distance in all directions utilizing iris scissors.
Trilobed Flap Text: The defect edges were debeveled with a #15 scalpel blade.  Given the location of the defect and the proximity to free margins a trilobed flap was deemed most appropriate.  Using a sterile surgical marker, an appropriate trilobed flap drawn around the defect.    The area thus outlined was incised deep to adipose tissue with a #15 scalpel blade.  The skin margins were undermined to an appropriate distance in all directions utilizing iris scissors.
Dorsal Nasal Flap Text: The defect edges were debeveled with a #15 scalpel blade.  Given the location of the defect and the proximity to free margins a dorsal nasal flap was deemed most appropriate.  Using a sterile surgical marker, an appropriate dorsal nasal flap was drawn around the defect.    The area thus outlined was incised deep to adipose tissue with a #15 scalpel blade.  The skin margins were undermined to an appropriate distance in all directions utilizing iris scissors.
Island Pedicle Flap Text: The defect edges were debeveled with a #15 scalpel blade.  Given the location of the defect, shape of the defect and the proximity to free margins an island pedicle advancement flap was deemed most appropriate.  Using a sterile surgical marker, an appropriate advancement flap was drawn incorporating the defect, outlining the appropriate donor tissue and placing the expected incisions within the relaxed skin tension lines where possible.    The area thus outlined was incised deep to adipose tissue with a #15 scalpel blade.  The skin margins were undermined to an appropriate distance in all directions around the primary defect and laterally outward around the island pedicle utilizing iris scissors.  There was minimal undermining beneath the pedicle flap.
Island Pedicle Flap With Canthal Suspension Text: The defect edges were debeveled with a #15 scalpel blade.  Given the location of the defect, shape of the defect and the proximity to free margins an island pedicle advancement flap was deemed most appropriate.  Using a sterile surgical marker, an appropriate advancement flap was drawn incorporating the defect, outlining the appropriate donor tissue and placing the expected incisions within the relaxed skin tension lines where possible. The area thus outlined was incised deep to adipose tissue with a #15 scalpel blade.  The skin margins were undermined to an appropriate distance in all directions around the primary defect and laterally outward around the island pedicle utilizing iris scissors.  There was minimal undermining beneath the pedicle flap. A suspension suture was placed in the canthal tendon to prevent tension and prevent ectropion.
Alar Island Pedicle Flap Text: The defect edges were debeveled with a #15 scalpel blade.  Given the location of the defect, shape of the defect and the proximity to the alar rim an island pedicle advancement flap was deemed most appropriate.  Using a sterile surgical marker, an appropriate advancement flap was drawn incorporating the defect, outlining the appropriate donor tissue and placing the expected incisions within the nasal ala running parallel to the alar rim. The area thus outlined was incised with a #15 scalpel blade.  The skin margins were undermined minimally to an appropriate distance in all directions around the primary defect and laterally outward around the island pedicle utilizing iris scissors.  There was minimal undermining beneath the pedicle flap.
Double Island Pedicle Flap Text: The defect edges were debeveled with a #15 scalpel blade.  Given the location of the defect, shape of the defect and the proximity to free margins a double island pedicle advancement flap was deemed most appropriate.  Using a sterile surgical marker, an appropriate advancement flap was drawn incorporating the defect, outlining the appropriate donor tissue and placing the expected incisions within the relaxed skin tension lines where possible.    The area thus outlined was incised deep to adipose tissue with a #15 scalpel blade.  The skin margins were undermined to an appropriate distance in all directions around the primary defect and laterally outward around the island pedicle utilizing iris scissors.  There was minimal undermining beneath the pedicle flap.
Island Pedicle Flap-Requiring Vessel Identification Text: The defect edges were debeveled with a #15 scalpel blade.  Given the location of the defect, shape of the defect and the proximity to free margins an island pedicle advancement flap was deemed most appropriate.  Using a sterile surgical marker, an appropriate advancement flap was drawn, based on the axial vessel mentioned above, incorporating the defect, outlining the appropriate donor tissue and placing the expected incisions within the relaxed skin tension lines where possible.    The area thus outlined was incised deep to adipose tissue with a #15 scalpel blade.  The skin margins were undermined to an appropriate distance in all directions around the primary defect and laterally outward around the island pedicle utilizing iris scissors.  There was minimal undermining beneath the pedicle flap.
Keystone Flap Text: The defect edges were debeveled with a #15 scalpel blade.  Given the location of the defect, shape of the defect a keystone flap was deemed most appropriate.  Using a sterile surgical marker, an appropriate keystone flap was drawn incorporating the defect, outlining the appropriate donor tissue and placing the expected incisions within the relaxed skin tension lines where possible. The area thus outlined was incised deep to adipose tissue with a #15 scalpel blade.  The skin margins were undermined to an appropriate distance in all directions around the primary defect and laterally outward around the flap utilizing iris scissors.
O-T Plasty Text: The defect edges were debeveled with a #15 scalpel blade.  Given the location of the defect, shape of the defect and the proximity to free margins an O-T plasty was deemed most appropriate.  Using a sterile surgical marker, an appropriate O-T plasty was drawn incorporating the defect and placing the expected incisions within the relaxed skin tension lines where possible.    The area thus outlined was incised deep to adipose tissue with a #15 scalpel blade.  The skin margins were undermined to an appropriate distance in all directions utilizing iris scissors.
O-Z Plasty Text: The defect edges were debeveled with a #15 scalpel blade.  Given the location of the defect, shape of the defect and the proximity to free margins an O-Z plasty (double transposition flap) was deemed most appropriate.  Using a sterile surgical marker, the appropriate transposition flaps were drawn incorporating the defect and placing the expected incisions within the relaxed skin tension lines where possible.    The area thus outlined was incised deep to adipose tissue with a #15 scalpel blade.  The skin margins were undermined to an appropriate distance in all directions utilizing iris scissors.  Hemostasis was achieved with electrocautery.  The flaps were then transposed into place, one clockwise and the other counterclockwise, and anchored with interrupted buried subcutaneous sutures.
Double O-Z Plasty Text: The defect edges were debeveled with a #15 scalpel blade.  Given the location of the defect, shape of the defect and the proximity to free margins a Double O-Z plasty (double transposition flap) was deemed most appropriate.  Using a sterile surgical marker, the appropriate transposition flaps were drawn incorporating the defect and placing the expected incisions within the relaxed skin tension lines where possible. The area thus outlined was incised deep to adipose tissue with a #15 scalpel blade.  The skin margins were undermined to an appropriate distance in all directions utilizing iris scissors.  Hemostasis was achieved with electrocautery.  The flaps were then transposed into place, one clockwise and the other counterclockwise, and anchored with interrupted buried subcutaneous sutures.
V-Y Plasty Text: The defect edges were debeveled with a #15 scalpel blade.  Given the location of the defect, shape of the defect and the proximity to free margins an V-Y advancement flap was deemed most appropriate.  Using a sterile surgical marker, an appropriate advancement flap was drawn incorporating the defect and placing the expected incisions within the relaxed skin tension lines where possible.    The area thus outlined was incised deep to adipose tissue with a #15 scalpel blade.  The skin margins were undermined to an appropriate distance in all directions utilizing iris scissors.
H Plasty Text: Given the location of the defect, shape of the defect and the proximity to free margins a H-plasty was deemed most appropriate for repair.  Using a sterile surgical marker, the appropriate advancement arms of the H-plasty were drawn incorporating the defect and placing the expected incisions within the relaxed skin tension lines where possible. The area thus outlined was incised deep to adipose tissue with a #15 scalpel blade. The skin margins were undermined to an appropriate distance in all directions utilizing iris scissors.  The opposing advancement arms were then advanced into place in opposite direction and anchored with interrupted buried subcutaneous sutures.
W Plasty Text: The lesion was extirpated to the level of the fat with a #15 scalpel blade.  Given the location of the defect, shape of the defect and the proximity to free margins a W-plasty was deemed most appropriate for repair.  Using a sterile surgical marker, the appropriate transposition arms of the W-plasty were drawn incorporating the defect and placing the expected incisions within the relaxed skin tension lines where possible.    The area thus outlined was incised deep to adipose tissue with a #15 scalpel blade.  The skin margins were undermined to an appropriate distance in all directions utilizing iris scissors.  The opposing transposition arms were then transposed into place in opposite direction and anchored with interrupted buried subcutaneous sutures.
Z Plasty Text: The lesion was extirpated to the level of the fat with a #15 scalpel blade.  Given the location of the defect, shape of the defect and the proximity to free margins a Z-plasty was deemed most appropriate for repair.  Using a sterile surgical marker, the appropriate transposition arms of the Z-plasty were drawn incorporating the defect and placing the expected incisions within the relaxed skin tension lines where possible.    The area thus outlined was incised deep to adipose tissue with a #15 scalpel blade.  The skin margins were undermined to an appropriate distance in all directions utilizing iris scissors.  The opposing transposition arms were then transposed into place in opposite direction and anchored with interrupted buried subcutaneous sutures.
Zygomaticofacial Flap Text: Given the location of the defect, shape of the defect and the proximity to free margins a zygomaticofacial flap was deemed most appropriate for repair.  Using a sterile surgical marker, the appropriate flap was drawn incorporating the defect and placing the expected incisions within the relaxed skin tension lines where possible. The area thus outlined was incised deep to adipose tissue with a #15 scalpel blade with preservation of a vascular pedicle.  The skin margins were undermined to an appropriate distance in all directions utilizing iris scissors.  The flap was then placed into the defect and anchored with interrupted buried subcutaneous sutures.
Cheek Interpolation Flap Text: A decision was made to reconstruct the defect utilizing an interpolation axial flap and a staged reconstruction.  A telfa template was made of the defect.  This telfa template was then used to outline the Cheek Interpolation flap.  The donor area for the pedicle flap was then injected with anesthesia.  The flap was excised through the skin and subcutaneous tissue down to the layer of the underlying musculature.  The interpolation flap was carefully excised within this deep plane to maintain its blood supply.  The edges of the donor site were undermined.   The donor site was closed in a primary fashion.  The pedicle was then rotated into position and sutured.  Once the tube was sutured into place, adequate blood supply was confirmed with blanching and refill.  The pedicle was then wrapped with xeroform gauze and dressed appropriately with a telfa and gauze bandage to ensure continued blood supply and protect the attached pedicle.
Cheek-To-Nose Interpolation Flap Text: A decision was made to reconstruct the defect utilizing an interpolation axial flap and a staged reconstruction.  A telfa template was made of the defect.  This telfa template was then used to outline the Cheek-To-Nose Interpolation flap.  The donor area for the pedicle flap was then injected with anesthesia.  The flap was excised through the skin and subcutaneous tissue down to the layer of the underlying musculature.  The interpolation flap was carefully excised within this deep plane to maintain its blood supply.  The edges of the donor site were undermined.   The donor site was closed in a primary fashion.  The pedicle was then rotated into position and sutured.  Once the tube was sutured into place, adequate blood supply was confirmed with blanching and refill.  The pedicle was then wrapped with xeroform gauze and dressed appropriately with a telfa and gauze bandage to ensure continued blood supply and protect the attached pedicle.
Interpolation Flap Text: A decision was made to reconstruct the defect utilizing an interpolation axial flap and a staged reconstruction.  A telfa template was made of the defect.  This telfa template was then used to outline the interpolation flap.  The donor area for the pedicle flap was then injected with anesthesia.  The flap was excised through the skin and subcutaneous tissue down to the layer of the underlying musculature.  The interpolation flap was carefully excised within this deep plane to maintain its blood supply.  The edges of the donor site were undermined.   The donor site was closed in a primary fashion.  The pedicle was then rotated into position and sutured.  Once the tube was sutured into place, adequate blood supply was confirmed with blanching and refill.  The pedicle was then wrapped with xeroform gauze and dressed appropriately with a telfa and gauze bandage to ensure continued blood supply and protect the attached pedicle.
Melolabial Interpolation Flap Text: A decision was made to reconstruct the defect utilizing an interpolation axial flap and a staged reconstruction.  A telfa template was made of the defect.  This telfa template was then used to outline the melolabial interpolation flap.  The donor area for the pedicle flap was then injected with anesthesia.  The flap was excised through the skin and subcutaneous tissue down to the layer of the underlying musculature.  The pedicle flap was carefully excised within this deep plane to maintain its blood supply.  The edges of the donor site were undermined.   The donor site was closed in a primary fashion.  The pedicle was then rotated into position and sutured.  Once the tube was sutured into place, adequate blood supply was confirmed with blanching and refill.  The pedicle was then wrapped with xeroform gauze and dressed appropriately with a telfa and gauze bandage to ensure continued blood supply and protect the attached pedicle.
Mastoid Interpolation Flap Text: A decision was made to reconstruct the defect utilizing an interpolation axial flap and a staged reconstruction.  A telfa template was made of the defect.  This telfa template was then used to outline the mastoid interpolation flap.  The donor area for the pedicle flap was then injected with anesthesia.  The flap was excised through the skin and subcutaneous tissue down to the layer of the underlying musculature.  The pedicle flap was carefully excised within this deep plane to maintain its blood supply.  The edges of the donor site were undermined.   The donor site was closed in a primary fashion.  The pedicle was then rotated into position and sutured.  Once the tube was sutured into place, adequate blood supply was confirmed with blanching and refill.  The pedicle was then wrapped with xeroform gauze and dressed appropriately with a telfa and gauze bandage to ensure continued blood supply and protect the attached pedicle.
Posterior Auricular Interpolation Flap Text: A decision was made to reconstruct the defect utilizing an interpolation axial flap and a staged reconstruction.  A telfa template was made of the defect.  This telfa template was then used to outline the posterior auricular interpolation flap.  The donor area for the pedicle flap was then injected with anesthesia.  The flap was excised through the skin and subcutaneous tissue down to the layer of the underlying musculature.  The pedicle flap was carefully excised within this deep plane to maintain its blood supply.  The edges of the donor site were undermined.   The donor site was closed in a primary fashion.  The pedicle was then rotated into position and sutured.  Once the tube was sutured into place, adequate blood supply was confirmed with blanching and refill.  The pedicle was then wrapped with xeroform gauze and dressed appropriately with a telfa and gauze bandage to ensure continued blood supply and protect the attached pedicle.
Paramedian Forehead Flap Text: A decision was made to reconstruct the defect utilizing an interpolation axial flap and a staged reconstruction.  A telfa template was made of the defect.  This telfa template was then used to outline the paramedian forehead pedicle flap.  The donor area for the pedicle flap was then injected with anesthesia.  The flap was excised through the skin and subcutaneous tissue down to the layer of the underlying musculature.  The pedicle flap was carefully excised within this deep plane to maintain its blood supply.  The edges of the donor site were undermined.   The donor site was closed in a primary fashion.  The pedicle was then rotated into position and sutured.  Once the tube was sutured into place, adequate blood supply was confirmed with blanching and refill.  The pedicle was then wrapped with xeroform gauze and dressed appropriately with a telfa and gauze bandage to ensure continued blood supply and protect the attached pedicle.
Abbe Flap (Upper To Lower Lip) Text: The defect of the lower lip was assessed and measured.  Given the location and size of the defect, an Abbe flap was deemed most appropriate.  Using a sterile surgical marker, an appropriate Abbe flap was measured and drawn on the upper lip. Local anesthesia was then infiltrated.  A scalpel was then used to incise the upper lip through and through the skin, vermilion, muscle and mucosa, leaving the flap pedicled on the opposite side.  The flap was then rotated and transferred to the lower lip defect.  The flap was then sutured into place with a three layer technique, closing the orbicularis oris muscle layer with subcutaneous buried sutures, followed by a mucosal layer and an epidermal layer.
Abbe Flap (Lower To Upper Lip) Text: The defect of the upper lip was assessed and measured.  Given the location and size of the defect, an Abbe flap was deemed most appropriate.  Using a sterile surgical marker, an appropriate Abbe flap was measured and drawn on the lower lip. Local anesthesia was then infiltrated. A scalpel was then used to incise the upper lip through and through the skin, vermilion, muscle and mucosa, leaving the flap pedicled on the opposite side.  The flap was then rotated and transferred to the lower lip defect.  The flap was then sutured into place with a three layer technique, closing the orbicularis oris muscle layer with subcutaneous buried sutures, followed by a mucosal layer and an epidermal layer.
Estlander Flap (Upper To Lower Lip) Text: The defect of the lower lip was assessed and measured.  Given the location and size of the defect, an Estlander flap was deemed most appropriate.  Using a sterile surgical marker, an appropriate Estlander flap was measured and drawn on the upper lip. Local anesthesia was then infiltrated. A scalpel was then used to incise the lateral aspect of the flap, through skin, muscle and mucosa, leaving the flap pedicled medially.  The flap was then rotated and positioned to fill the lower lip defect.  The flap was then sutured into place with a three layer technique, closing the orbicularis oris muscle layer with subcutaneous buried sutures, followed by a mucosal layer and an epidermal layer.
Cheiloplasty (Less Than 50%) Text: A decision was made to reconstruct the defect with a  cheiloplasty.  The defect was undermined extensively.  Additional orbicularis oris muscle was excised with a 15 blade scalpel.  The defect was converted into a full thickness wedge, of less than 50% of the vertical height of the lip, to facilite a better cosmetic result.  Small vessels were then tied off with 5-0 monocyrl. The orbicularis oris, superficial fascia, adipose and dermis were then reapproximated.  After the deeper layers were approximated the epidermis was reapproximated with particular care given to realign the vermilion border.
Cheiloplasty (Complex) Text: A decision was made to reconstruct the defect with a  cheiloplasty.  The defect was undermined extensively.  Additional orbicularis oris muscle was excised with a 15 blade scalpel.  The defect was converted into a full thickness wedge to facilite a better cosmetic result.  Small vessels were then tied off with 5-0 monocyrl. The orbicularis oris, superficial fascia, adipose and dermis were then reapproximated.  After the deeper layers were approximated the epidermis was reapproximated with particular care given to realign the vermilion border.
Ear Wedge Repair Text: A wedge excision was completed by carrying down an excision through the full thickness of the ear and cartilage with an inward facing Burow's triangle. The wound was then closed in a layered fashion.
Full Thickness Lip Wedge Repair (Flap) Text: Given the location of the defect and the proximity to free margins a full thickness wedge repair was deemed most appropriate.  Using a sterile surgical marker, the appropriate repair was drawn incorporating the defect and placing the expected incisions perpendicular to the vermilion border.  The vermilion border was also meticulously outlined to ensure appropriate reapproximation during the repair.  The area thus outlined was incised through and through with a #15 scalpel blade.  The muscularis and dermis were reaproximated with deep sutures following hemostasis. Care was taken to realign the vermilion border before proceeding with the superficial closure.  Once the vermilion was realigned the superfical and mucosal closure was finished.
Ftsg Text: The defect edges were debeveled with a #15 scalpel blade.  Given the location of the defect, shape of the defect and the proximity to free margins a full thickness skin graft was deemed most appropriate.  Using a sterile surgical marker, the primary defect shape was transferred to the donor site. The area thus outlined was incised deep to adipose tissue with a #15 scalpel blade.  The harvested graft was then trimmed of adipose tissue until only dermis and epidermis was left.  The skin margins of the secondary defect were undermined to an appropriate distance in all directions utilizing iris scissors.  The secondary defect was closed with interrupted buried subcutaneous sutures.  The skin edges were then re-apposed with running  sutures.  The skin graft was then placed in the primary defect and oriented appropriately.
Split-Thickness Skin Graft Text: The defect edges were debeveled with a #15 scalpel blade.  Given the location of the defect, shape of the defect and the proximity to free margins a split thickness skin graft was deemed most appropriate.  Using a sterile surgical marker, the primary defect shape was transferred to the donor site. The split thickness graft was then harvested.  The skin graft was then placed in the primary defect and oriented appropriately.
Burow's Graft Text: The defect edges were debeveled with a #15 scalpel blade.  Given the location of the defect, shape of the defect, the proximity to free margins and the presence of a standing cone deformity a Burow's skin graft was deemed most appropriate. The standing cone was removed and this tissue was then trimmed to the shape of the primary defect. The adipose tissue was also removed until only dermis and epidermis were left.  The skin margins of the secondary defect were undermined to an appropriate distance in all directions utilizing iris scissors.  The secondary defect was closed with interrupted buried subcutaneous sutures.  The skin edges were then re-apposed with running  sutures.  The skin graft was then placed in the primary defect and oriented appropriately.
Cartilage Graft Text: The defect edges were debeveled with a #15 scalpel blade.  Given the location of the defect, shape of the defect, the fact the defect involved a full thickness cartilage defect a cartilage graft was deemed most appropriate.  An appropriate donor site was identified, cleansed, and anesthetized. The cartilage graft was then harvested and transferred to the recipient site, oriented appropriately and then sutured into place.  The secondary defect was then repaired using a primary closure.
Composite Graft Text: The defect edges were debeveled with a #15 scalpel blade.  Given the location of the defect, shape of the defect, the proximity to free margins and the fact the defect was full thickness a composite graft was deemed most appropriate.  The defect was outline and then transferred to the donor site.  A full thickness graft was then excised from the donor site. The graft was then placed in the primary defect, oriented appropriately and then sutured into place.  The secondary defect was then repaired using a primary closure.
Epidermal Autograft Text: The defect edges were debeveled with a #15 scalpel blade.  Given the location of the defect, shape of the defect and the proximity to free margins an epidermal autograft was deemed most appropriate.  Using a sterile surgical marker, the primary defect shape was transferred to the donor site. The epidermal graft was then harvested.  The skin graft was then placed in the primary defect and oriented appropriately.
Dermal Autograft Text: The defect edges were debeveled with a #15 scalpel blade.  Given the location of the defect, shape of the defect and the proximity to free margins a dermal autograft was deemed most appropriate.  Using a sterile surgical marker, the primary defect shape was transferred to the donor site. The area thus outlined was incised deep to adipose tissue with a #15 scalpel blade.  The harvested graft was then trimmed of adipose and epidermal tissue until only dermis was left.  The skin graft was then placed in the primary defect and oriented appropriately.
Skin Substitute Text: The defect edges were debeveled with a #15 scalpel blade.  Given the location of the defect, shape of the defect and the proximity to free margins a skin substitute graft was deemed most appropriate.  The graft material was trimmed to fit the size of the defect. The graft was then placed in the primary defect and oriented appropriately.
Tissue Cultured Epidermal Autograft Text: The defect edges were debeveled with a #15 scalpel blade.  Given the location of the defect, shape of the defect and the proximity to free margins a tissue cultured epidermal autograft was deemed most appropriate.  The graft was then trimmed to fit the size of the defect.  The graft was then placed in the primary defect and oriented appropriately.
Xenograft Text: The defect edges were debeveled with a #15 scalpel blade.  Given the location of the defect, shape of the defect and the proximity to free margins a xenograft was deemed most appropriate.  The graft was then trimmed to fit the size of the defect.  The graft was then placed in the primary defect and oriented appropriately.
Purse String (Simple) Text: Given the location of the defect and the characteristics of the surrounding skin a purse string closure was deemed most appropriate.  Undermining was performed circumferentially around the surgical defect.  A purse string suture was then placed and tightened.
Purse String (Intermediate) Text: Given the location of the defect and the characteristics of the surrounding skin a purse string intermediate closure was deemed most appropriate.  Undermining was performed circumferentially around the surgical defect.  A purse string suture was then placed and tightened.
Partial Purse String (Simple) Text: Given the location of the defect and the characteristics of the surrounding skin a simple purse string closure was deemed most appropriate.  Undermining was performed circumferentially around the surgical defect.  A purse string suture was then placed and tightened. Wound tension only allowed a partial closure of the circular defect.
Partial Purse String (Intermediate) Text: Given the location of the defect and the characteristics of the surrounding skin an intermediate purse string closure was deemed most appropriate.  Undermining was performed circumferentially around the surgical defect.  A purse string suture was then placed and tightened. Wound tension only allowed a partial closure of the circular defect.
Localized Dermabrasion With Wire Brush Text: The patient was draped in routine manner.  Localized dermabrasion using 3 x 17 mm wire brush was performed in routine manner to papillary dermis. This spot dermabrasion is being performed to complete skin cancer reconstruction. It also will eliminate the other sun damaged precancerous cells that are known to be part of the regional effect of a lifetime's worth of sun exposure. This localized dermabrasion is therapeutic and should not be considered cosmetic in any regard.
Tarsorrhaphy Text: A tarsorrhaphy was performed using Frost sutures.
Complex Repair And Flap Additional Text (Will Appearing After The Standard Complex Repair Text): The complex repair was not sufficient to completely close the primary defect. The remaining additional defect was repaired with the flap mentioned below.
Complex Repair And Graft Additional Text (Will Appearing After The Standard Complex Repair Text): The complex repair was not sufficient to completely close the primary defect. The remaining additional defect was repaired with the graft mentioned below.
Manual Repair Warning Statement: We plan on removing the manually selected variable below in favor of our much easier automatic structured text blocks found in the previous tab. We decided to do this to help make the flow better and give you the full power of structured data. Manual selection is never going to be ideal in our platform and I would encourage you to avoid using manual selection from this point on, especially since I will be sunsetting this feature. It is important that you do one of two things with the customized text below. First, you can save all of the text in a word file so you can have it for future reference. Second, transfer the text to the appropriate area in the Library tab. Lastly, if there is a flap or graft type which we do not have you need to let us know right away so I can add it in before the variable is hidden. No need to panic, we plan to give you roughly 6 months to make the change.
Same Histology In Subsequent Stages Text: The pattern and morphology of the tumor is as described in the first stage.
No Residual Tumor Seen Histology Text: There were no malignant cells seen in the sections examined.
Inflammation Suggestive Of Cancer Camouflage Histology Text: There was a dense lymphocytic infiltrate which prevented adequate histologic evaluation of adjacent structures.
Bcc Histology Text: There were numerous aggregates of basaloid cells.
Bcc Infiltrative Histology Text: There were numerous aggregates of basaloid cells demonstrating an infiltrative pattern.
Mart-1 - Positive Histology Text: MART-1 staining demonstrates areas of higher density and clustering of melanocytes with Pagetoid spread upwards within the epidermis. The surgical margins are positive for tumor cells.
Mart-1 - Negative Histology Text: MART-1 staining demonstrates a normal density and pattern of melanocytes along the dermal-epidermal junction. The surgical margins are negative for tumor cells.
Information: Selecting Yes will display possible errors in your note based on the variables you have selected. This validation is only offered as a suggestion for you. PLEASE NOTE THAT THE VALIDATION TEXT WILL BE REMOVED WHEN YOU FINALIZE YOUR NOTE. IF YOU WANT TO FAX A PRELIMINARY NOTE YOU WILL NEED TO TOGGLE THIS TO 'NO' IF YOU DO NOT WANT IT IN YOUR FAXED NOTE.

## 2022-12-07 ENCOUNTER — APPOINTMENT (RX ONLY)
Dept: URBAN - METROPOLITAN AREA OTHER 1 | Facility: OTHER | Age: 62
Setting detail: DERMATOLOGY
End: 2022-12-07

## 2022-12-07 DIAGNOSIS — Z48.02 ENCOUNTER FOR REMOVAL OF SUTURES: ICD-10-CM

## 2022-12-07 PROCEDURE — ? SUTURE REMOVAL (GLOBAL PERIOD)

## 2022-12-07 PROCEDURE — 99024 POSTOP FOLLOW-UP VISIT: CPT

## 2022-12-07 ASSESSMENT — LOCATION ZONE DERM: LOCATION ZONE: FACE

## 2022-12-07 ASSESSMENT — LOCATION SIMPLE DESCRIPTION DERM: LOCATION SIMPLE: RIGHT CHEEK

## 2022-12-07 ASSESSMENT — LOCATION DETAILED DESCRIPTION DERM: LOCATION DETAILED: RIGHT CENTRAL MALAR CHEEK

## 2022-12-07 NOTE — PROCEDURE: SUTURE REMOVAL (GLOBAL PERIOD)
Detail Level: Detailed
Add 70879 Cpt? (Important Note: In 2017 The Use Of 66980 Is Being Tracked By Cms To Determine Future Global Period Reimbursement For Global Periods): yes

## 2022-12-12 ENCOUNTER — CLAIMS CREATED FROM THE CLAIM WINDOW (OUTPATIENT)
Dept: URBAN - METROPOLITAN AREA SURGERY CENTER 16 | Facility: SURGERY CENTER | Age: 62
End: 2022-12-12
Payer: COMMERCIAL

## 2022-12-12 ENCOUNTER — OFFICE VISIT (OUTPATIENT)
Dept: URBAN - METROPOLITAN AREA SURGERY CENTER 16 | Facility: SURGERY CENTER | Age: 62
End: 2022-12-12

## 2022-12-12 ENCOUNTER — CLAIMS CREATED FROM THE CLAIM WINDOW (OUTPATIENT)
Dept: URBAN - METROPOLITAN AREA CLINIC 4 | Facility: CLINIC | Age: 62
End: 2022-12-12
Payer: COMMERCIAL

## 2022-12-12 DIAGNOSIS — K50.00 CICATRIZING ENTEROCOLITIS: ICD-10-CM

## 2022-12-12 DIAGNOSIS — K50.00 CROHN'S DISEASE OF SMALL INTESTINE WITHOUT COMPLICATIONS: ICD-10-CM

## 2022-12-12 PROCEDURE — 88305 TISSUE EXAM BY PATHOLOGIST: CPT | Performed by: PATHOLOGY

## 2022-12-12 PROCEDURE — 45380 COLONOSCOPY AND BIOPSY: CPT | Performed by: INTERNAL MEDICINE

## 2022-12-12 PROCEDURE — G8907 PT DOC NO EVENTS ON DISCHARG: HCPCS | Performed by: INTERNAL MEDICINE

## 2022-12-12 RX ORDER — CETIRIZINE HYDROCHLORIDE 10 MG/1
TAKE 1 TABLET (10 MG) BY ORAL ROUTE ONCE DAILY TABLET, FILM COATED ORAL 1
Qty: 0 | Refills: 0 | Status: ACTIVE | COMMUNITY
Start: 1900-01-01

## 2022-12-12 RX ORDER — COLESEVELAM 625 MG/1
TAKE 1 TO 3 TABLETS BY MOUTH DAILY WITH LUNCH TABLET, FILM COATED ORAL
Qty: 90 TABLET | Refills: 12 | Status: ACTIVE | COMMUNITY

## 2022-12-12 RX ORDER — FINASTERIDE 5 MG/1
TAKE 1 TABLET (5 MG) BY ORAL ROUTE ONCE DAILY TABLET, FILM COATED ORAL 1
Qty: 0 | Refills: 0 | Status: ACTIVE | COMMUNITY
Start: 1900-01-01

## 2022-12-12 RX ORDER — VALACYCLOVIR HCL 500 MG
TAKE 1 TABLET (500 MG) BY ORAL ROUTE ONCE DAILY TABLET ORAL 1
Qty: 0 | Refills: 0 | Status: ACTIVE | COMMUNITY
Start: 1900-01-01

## 2022-12-12 RX ORDER — BUPROPION HCL 300 MG
TAKE 1 TABLET (300 MG) BY ORAL ROUTE ONCE DAILY TABLET, EXTENDED RELEASE 24 HR ORAL 1
Qty: 0 | Refills: 0 | Status: ACTIVE | COMMUNITY
Start: 1900-01-01

## 2022-12-12 RX ORDER — AMITRIPTYLINE HYDROCHLORIDE 10 MG/1
1 -2 TABLETS TABLET, FILM COATED ORAL
Qty: 180 | Refills: 3 | Status: ACTIVE | COMMUNITY

## 2022-12-12 RX ORDER — PROPRANOLOL HYDROCHLORIDE 10 MG/1
TAKE 1 TABLET (10 MG) BY ORAL ROUTE  ONCE PER DAY TABLET ORAL
Qty: 0 | Refills: 0 | Status: ACTIVE | COMMUNITY
Start: 1900-01-01

## 2022-12-12 RX ORDER — LISINOPRIL AND HYDROCHLOROTHIAZIDE TABLETS 20; 25 MG/1; MG/1
TAKE 1/2  TABLET BY ORAL ROUTE ONCE DAILY TABLET ORAL 1
Qty: 0 | Refills: 0 | Status: ACTIVE | COMMUNITY
Start: 1900-01-01

## 2022-12-12 RX ORDER — ADALIMUMAB 40MG/0.4ML
INJECT 1 PEN UNDER THE SKIN EVERY 2 WEEKS IN ABDOMEN OR THIGH KIT SUBCUTANEOUS
Qty: 1 | Refills: 11 | Status: ACTIVE | COMMUNITY

## 2022-12-12 RX ORDER — FLUTICASONE PROPIONATE 50 UG/1
SPRAY 1 SPRAY (50 MCG) IN EACH NOSTRIL BY INTRANASAL ROUTE ONCE DAILY SPRAY, METERED NASAL 1
Qty: 1 | Refills: 0 | Status: ACTIVE | COMMUNITY
Start: 1900-01-01

## 2022-12-12 RX ORDER — ROSUVASTATIN CALCIUM 40 MG/1
TAKE 1 TABLET (40 MG) BY ORAL ROUTE ONCE DAILY TABLET, FILM COATED ORAL 1
Qty: 0 | Refills: 0 | Status: ACTIVE | COMMUNITY
Start: 1900-01-01

## 2023-01-10 ENCOUNTER — OFFICE VISIT (OUTPATIENT)
Dept: URBAN - METROPOLITAN AREA CLINIC 105 | Facility: CLINIC | Age: 63
End: 2023-01-10
Payer: COMMERCIAL

## 2023-01-10 VITALS
TEMPERATURE: 89 F | HEIGHT: 71 IN | WEIGHT: 213.4 LBS | SYSTOLIC BLOOD PRESSURE: 107 MMHG | BODY MASS INDEX: 29.88 KG/M2 | DIASTOLIC BLOOD PRESSURE: 76 MMHG

## 2023-01-10 DIAGNOSIS — R19.7 DIARRHEA: ICD-10-CM

## 2023-01-10 DIAGNOSIS — K50.00 CROHN'S DISEASE INVOLVING TERMINAL ILEUM: ICD-10-CM

## 2023-01-10 DIAGNOSIS — Z86.010 PERSONAL HISTORY OF COLON POLYPS: ICD-10-CM

## 2023-01-10 DIAGNOSIS — L21.9 SEBORRHEA: ICD-10-CM

## 2023-01-10 DIAGNOSIS — K58.9 IBS (IRRITABLE BOWEL SYNDROME): ICD-10-CM

## 2023-01-10 PROBLEM — 428283002 HISTORY OF POLYP OF COLON: Status: ACTIVE | Noted: 2021-05-04

## 2023-01-10 PROCEDURE — 99214 OFFICE O/P EST MOD 30 MIN: CPT | Performed by: INTERNAL MEDICINE

## 2023-01-10 RX ORDER — CETIRIZINE HYDROCHLORIDE 10 MG/1
TAKE 1 TABLET (10 MG) BY ORAL ROUTE ONCE DAILY TABLET, FILM COATED ORAL 1
Qty: 0 | Refills: 0 | Status: ACTIVE | COMMUNITY
Start: 1900-01-01

## 2023-01-10 RX ORDER — FLUTICASONE PROPIONATE 50 UG/1
SPRAY 1 SPRAY (50 MCG) IN EACH NOSTRIL BY INTRANASAL ROUTE ONCE DAILY SPRAY, METERED NASAL 1
Qty: 1 | Refills: 0 | Status: ACTIVE | COMMUNITY
Start: 1900-01-01

## 2023-01-10 RX ORDER — AMITRIPTYLINE HYDROCHLORIDE 10 MG/1
1 -2 TABLETS TABLET, FILM COATED ORAL
Qty: 180 | Refills: 3 | Status: ACTIVE | COMMUNITY

## 2023-01-10 RX ORDER — FINASTERIDE 5 MG/1
TAKE 1 TABLET (5 MG) BY ORAL ROUTE ONCE DAILY TABLET, FILM COATED ORAL 1
Qty: 0 | Refills: 0 | Status: ACTIVE | COMMUNITY
Start: 1900-01-01

## 2023-01-10 RX ORDER — BUPROPION HCL 300 MG
TAKE 1 TABLET (300 MG) BY ORAL ROUTE ONCE DAILY TABLET, EXTENDED RELEASE 24 HR ORAL 1
Qty: 0 | Refills: 0 | Status: ACTIVE | COMMUNITY
Start: 1900-01-01

## 2023-01-10 RX ORDER — LISINOPRIL AND HYDROCHLOROTHIAZIDE TABLETS 20; 25 MG/1; MG/1
TAKE 1/2  TABLET BY ORAL ROUTE ONCE DAILY TABLET ORAL 1
Qty: 0 | Refills: 0 | Status: ACTIVE | COMMUNITY
Start: 1900-01-01

## 2023-01-10 RX ORDER — ROSUVASTATIN CALCIUM 40 MG/1
TAKE 1 TABLET (40 MG) BY ORAL ROUTE ONCE DAILY TABLET, FILM COATED ORAL 1
Qty: 0 | Refills: 0 | Status: ACTIVE | COMMUNITY
Start: 1900-01-01

## 2023-01-10 RX ORDER — COLESEVELAM 625 MG/1
TAKE 1 TO 3 TABLETS BY MOUTH DAILY WITH LUNCH TABLET, FILM COATED ORAL
Qty: 90 TABLET | Refills: 12 | Status: ACTIVE | COMMUNITY

## 2023-01-10 RX ORDER — VALACYCLOVIR HCL 500 MG
TAKE 1 TABLET (500 MG) BY ORAL ROUTE ONCE DAILY TABLET ORAL 1
Qty: 0 | Refills: 0 | Status: ACTIVE | COMMUNITY
Start: 1900-01-01

## 2023-01-10 RX ORDER — ADALIMUMAB 40MG/0.4ML
INJECT 1 PEN UNDER THE SKIN EVERY 2 WEEKS IN ABDOMEN OR THIGH KIT SUBCUTANEOUS
Qty: 1 | Refills: 11 | Status: ACTIVE | COMMUNITY

## 2023-01-10 RX ORDER — PROPRANOLOL HYDROCHLORIDE 10 MG/1
TAKE 1 TABLET (10 MG) BY ORAL ROUTE  ONCE PER DAY TABLET ORAL
Qty: 0 | Refills: 0 | Status: ACTIVE | COMMUNITY
Start: 1900-01-01

## 2023-01-10 NOTE — HPI-OTHER HISTORIES
PAST MEDICAL HISTORY: The patient stated he had a trip to [place] in Jan. and Feb 2017. He had gotten a parasite in his previous trips but these symptoms were different. He was having loose, watery diarrhea. Some days he could have formed stool. He could go up to 8 BMs in the day. He would usually have 5 BMs daily most in the AM. One pill of Imodium provided relief for the rest of the day. Stress and eating could exacerbate symptoms. He noted he had been under a lot of work stress and wondered if this was IBS. The patient stated that hyoscyamine had provided 50% relief of his abdominal pain. He localized his LLQ pain over his previous surgical scar. He noted seeing a cosmetic surgeon because of the unevenness of the sides of the scar, but they differed any further management to his original surgeon.   Review of his [facility] chart noted an admit from 9/21/15 to 9/28/15 where he was noted to have a SB perforation. He had an exp lap with small bowel resection on 9/21/15. His later course was complicated by two intra-abdominal abscesses subsequently requiring IandD of peritoneal abscess with washout on 10/8/17. The cause of the small bowel perforation was not known. The patient had a colonoscopy on 4/28/17 which noted multiple ulcers in the TI and biopsies were significant for active ileitis. The patient had only taken Tylenol PRN since his resection. He denied NSAID use. He had a repeat colonoscopy on 8/11/17 which noted a few erosions in the TI consistent with ileal Crohn's; biopsies noted active enteritis with ulcer.   On visit 6/02/17, patient noted his bowel habit had improved and his abdominal pain was better after taking Cipro x 10 days. His ANCA/ASCA lab were negative for IBD. On visit 9/21/17, he stated he began having abdominal discomfort 1 x week and non-bloody diarrhea with a fever up to 101F on 9/18/17. He denied any upper GI symptoms with it. He was started on Cipro/Flagyl by Dr. Galvan. Then on visit 9/28/17 he stated his diarrhea had improved. He denied any watery diarrhea. He had 6 BMs before clinic visit with soft stool. He reported having abdominal pain on 9/26/17 with a temperature up to 100.8. He stated he woke up with a fever on 9/27/17 morning which resolved. On visit 10/10/17 he had finished his antibiotic treatment and stated that his BMs had improved with 4-5 BMs with formation but denied watery diarrhea. He also noted intermittent lower abdominal pain.   On visit 12/22/17 he stated he had no issues with diarrhea and denied abdominal pain. He had started the 6-MP 50 mg daily x 1 month and then decreased to 25 mg x 1 month because he had concerns that the 6-MP was increasing his susceptibility to having a cold. On 3/26/18, he stated he was doing well on 6-mp 50 mg daily. He denied significant diarrhea and abdominal pain.   On 6/28/18, the patient mentioned that last week his stomach felt sensitive and he experienced some cramping. He reported taking hyoscamine to help with the abdominal discomfort. He also reported his discomfort could be stress related due to stress from his job as an . The patient mentioned that he was doing well on the 6mp 50mg QD.  He stated he recently went to [facility] for work. During his travel, he experienced abdominal discomfort after eating the airplane food that was served. He also stated having one loose stool but no diarrhea after that. He was scheduled to return to [facility] next week. The patient denied any other GI symptoms.   A colonoscopy on 8/27/18 revealed a single ulcer and focal active enteritis in the terminal ileum.   On 9/24/18, it was discussed with the patient about increasing his 6-mp due to some inflammation still present in the ileum while on 6-mp 50 mg QD. The patient noted having to very rarely use hyoscamine. He denied any other GI symptoms.   On 10/15/18, the patient noted he was doing well on 6- mg QD. He had been experiencing a rash on his face in the past month and wanted to know if it was related to the higher dose of 6-MP. He denied any other GI symptoms.   On 12/5/18, He noted no improvement in his rash on 6-mp 50 mg daily so he discontinued 6-mp. Off for 1-2 weeks he did not have a rash.  He resumed at 50 mg and had a recurrent facial rash.  He had seen his dermatologist when the rash had resolved and had a follow-up the next week after the rash had reoccured.  He also followed with an allergist, Dr. Ángel Chew and he noted he is "allergic to everything" and is on allergy shots. He denied any GI symptoms. He stated that he would use the hyoscyamine maybe once a year.   On 1/16/19, he reported that his facial rash had not returned since his last visit and he was doing well. He saw a dermatologist who had no further recomendation. He denied any GI symptoms.  On 2/27/19, he said that starting Friday he had bloating, diarrhea, loss of appetite, and abdominal pain and these symptoms were persisting. He traveled to [facility] Wednesday and returned Friday night when he started feeling worse. He noted he had occasional nausea as well. He had not felt sick after previous trips to [facility]. He stopped taking 6-MP because he got a rash. He said he had taken prednisone in the past and believed he tolerated it.   On 3/20/19, he said he was having 1-2 BMs/day with occasional watery diarrhea. On Monday and Tuesday he noted having nausea and mild periumbilical pain. He took hyoscyamine SL 0.125 mg PRN which provided relief. He took ondansetron on Monday and said it worked for a while, but had to take it again a few hours later. He took Cipro 500 mg for 7 days continues on prednisone 20 mg QD and said this regimen had helped. He was going to start on Humira injections on Friday.  On 4/10/19, he said he had started Humira and denied having a reaction to it. He noted decreased BM frequency with 4-5 soft/loose BMs/day. He denied abdominal pain and said he was eating ok. He noted taking prednisone 20 mg QD had affected his sleep and made him more irritable. He also noted that the rash on his face came back when he reduced the prednisone to 10 mg daily on his own.  It then resolved when he went back up to 20 mg daily.  On 5/1/19, he said he was doing well. He was still tapering off prednisone and was on 5 mg QD. He noted 2-3 soft, not watery BMs QD. He denied abdominal pain and his appetite was good. He had not needed any hyoscyamine. He noted his face rash was slightly noticeable.  On 8/6/19, he said his facial rash was better after using a topical medicine from his dermatologist. He was getting Humira every 2 weeks. He noted episodes every 3 weeks that would last for 3 days with 5-6 watery BMs/day, or some days he would have no BM. He was off prednisone. He still took trazodone for insomnia which helped him sleep.   On 11/12/19, he was taking Welchol 625 mg 1 pill QD. He took it at lunch or dinner. He noted a benefit. He had 2-3 BMs/day. They were formed 80% of the time.  He hadn't had any recent lab work with Dr. Escamilla. His physical was at the end of December. Recently, he had traveled to places like [place], [place], and [place] [facility].   On 9/23/20, he said he had a flare-up a couple weeks ago where he was having 6 BMs QAM. He was having frequent formed stools with some LLQ pain. He usually had 2-3 formed or soft BMs/day without blood. He continued on Humira every 2 weeks (injects into his thigh).  On 12/8/20, he said he was in the middle of a "flare-up." He had 4-5 medium formed BMs/day with lower abdominal pain and urgency. Pain resolved after a BM he stated.  He denied watery diarrhea and he denied rectal bleeding. He was on Colesevelam 2 pills/day with lunch 3-4x/week - which did help to form his BMs. He had his adalimumab+ab levels checked last week - lab pending. He took trazadone 100 mg (cuts 150 mg pill) for insomnia.  On 1/19/21, he said he had 1 formed BM/day without blood. He denied abdominal pain.  He had started on amitriptyline 10 mg QHS and was now off Welchol/colesevelam. He continued on trazadone 1/2 pill QD and was trying to find the right dosage of amitriptyline - either 10 or 20 mg at bedtime. His facial seborrhea had resolved.  On 5/4/21, he said he continued on Humira every 2 weeks. He continued to have 1 BM/day without blood. He only had intermittent minor lower abdominal pain at his incision site last week. Pain was now resolved. He had increased to amitriptyline 10 mg 2 pills QHS. He was sleeping well on this medication. He continued on trazadone 0.5 pill QHS. He had not had a recurrence of facial seborrhea.  On 11/2/21, he said he was off Humira for 5-6 weeks due to receiving the COVID booster shot - he received the booster twice until he developed an immune response he stated. While off, he noted increased BM frequency, intermittent diarrhea (without blood), gas, and abdominal crampings. He had received 2 injections so far - now had 2 formed BMs QD. He was off it due to getting the COVID booster vaccine - actually received it a second time to see if there was an immune response. He continued on amitriptyline 10 mg 2 pills QHS and trazadone 0.5 pill QD.  On 7/6/22, he said he continued on Humira every 2 weeks - no diarrhea, bleeding, or abdominal pain. He continued on amitriptyline 10 mg 2 pills QHS.   He had started a new job and was on new insurance. He was told that Humira would cost $2500/month.  HPI: Today, he says he continues on Humira [id number] - never forgets a dosage. His last dosage was on New Year's Madeline. He has daily mid-lower abdominal pain that resolves after having a BM. He continues on amitriptyline 10 mg 2 pills QHS.   Labs 7/6/22 - Quant-TB negative. CBC, CMP all normal. 11/2/21 - CBC, CMP all normal. 5/4/21 - CBC, CMP all normal. 12/2/20 - Adalimumab level 8.7 and antibody 74. 9/23/20 - Tb negative. CBC, CMP all normal.   3/29/19 - CBC, CMP, Lipids, B12, TSH, Vitamin D all normal. 2/27/19 - GI stool panel negative. CBC, CMP, and Vitamin B12 normal.  HCV Ab, HBsAG, and Quant Gold TB all negative. CRP 10.8, Hep B surface Ab reactive.  10/15/18 - CBC normal, CMP normal, 6-, 6-MMPN 2361 on 6-mp 100 mg daily. 6/28/18- CBC normal, CMP normal.  3/26/18- CBC normal, CMP normal, 6TG 84, 6MMPN 132 on 6-mp 50 mg daily.  12/22/17 - CBC/CMP normal. CRP 0.6.   9/28/17 CBC/CMP normal, CRP 52, sed rate 8.   9/2/17 Stool campylobacter/shiga toxin negative, C diff cx and toxin A/B negative, OandPx1 negative.  Stool salmonella/shigella pending. 9/6/17 [facility] - negative.   8/31/17 TPMT 23.7.   4/11/17 GI pathogen stool study negative, but does not test for Yersenia.   4/11/17 TSH/FT4 normal, CBC normal, CMP normal except for a sodium of 145.  ttg IgA negative, IgA level normal, sed rate 2/normal, CRP normal, lipase 47/normal.

## 2023-01-28 ENCOUNTER — TELEPHONE ENCOUNTER (OUTPATIENT)
Dept: URBAN - METROPOLITAN AREA CLINIC 92 | Facility: CLINIC | Age: 63
End: 2023-01-28

## 2023-01-28 LAB
ADALIMUMAB AB, IBD: <10
ADALIMUMAB AB, IBD: <10
ADALIMUMAB LEVEL, IBD: 5.6
COMMENT: (no result)
INTERPRETATION: (no result)

## 2023-01-28 RX ORDER — ADALIMUMAB 40MG/0.4ML
INJECT 1 PEN KIT SUBCUTANEOUS
Qty: 4 | Refills: 11 | OUTPATIENT

## 2023-01-30 ENCOUNTER — P2P PATIENT RECORD (OUTPATIENT)
Age: 63
End: 2023-01-30

## 2023-02-08 ENCOUNTER — WEB ENCOUNTER (OUTPATIENT)
Dept: URBAN - METROPOLITAN AREA CLINIC 105 | Facility: CLINIC | Age: 63
End: 2023-02-08

## 2023-07-31 ENCOUNTER — ERX REFILL RESPONSE (OUTPATIENT)
Dept: URBAN - METROPOLITAN AREA CLINIC 105 | Facility: CLINIC | Age: 63
End: 2023-07-31

## 2023-07-31 RX ORDER — AMITRIPTYLINE HYDROCHLORIDE 10 MG/1
TAKE 1 TO 2 TABLETS BY  MOUTH ONCE DAILY AT BEDTIME TABLET, FILM COATED ORAL
Qty: 180 TABLET | Refills: 3 | OUTPATIENT

## 2023-07-31 RX ORDER — AMITRIPTYLINE HYDROCHLORIDE 10 MG/1
1 -2 TABLETS TABLET, FILM COATED ORAL
Qty: 180 | Refills: 3 | OUTPATIENT

## 2023-09-06 ENCOUNTER — P2P PATIENT RECORD (OUTPATIENT)
Age: 63
End: 2023-09-06

## 2023-09-22 ENCOUNTER — OFFICE VISIT (OUTPATIENT)
Dept: URBAN - METROPOLITAN AREA CLINIC 105 | Facility: CLINIC | Age: 63
End: 2023-09-22
Payer: COMMERCIAL

## 2023-09-22 VITALS
HEIGHT: 71 IN | DIASTOLIC BLOOD PRESSURE: 79 MMHG | TEMPERATURE: 97.2 F | SYSTOLIC BLOOD PRESSURE: 113 MMHG | WEIGHT: 218 LBS | HEART RATE: 106 BPM | BODY MASS INDEX: 30.52 KG/M2

## 2023-09-22 DIAGNOSIS — K58.8 OTHER IRRITABLE BOWEL SYNDROME: ICD-10-CM

## 2023-09-22 DIAGNOSIS — K50.00 CROHN'S DISEASE INVOLVING TERMINAL ILEUM: ICD-10-CM

## 2023-09-22 DIAGNOSIS — Z86.010 PERSONAL HISTORY OF COLON POLYPS: ICD-10-CM

## 2023-09-22 DIAGNOSIS — R19.7 DIARRHEA: ICD-10-CM

## 2023-09-22 PROCEDURE — 99214 OFFICE O/P EST MOD 30 MIN: CPT | Performed by: INTERNAL MEDICINE

## 2023-09-22 RX ORDER — ROSUVASTATIN CALCIUM 40 MG/1
TAKE 1 TABLET (40 MG) BY ORAL ROUTE ONCE DAILY TABLET, FILM COATED ORAL 1
Qty: 0 | Refills: 0 | Status: ACTIVE | COMMUNITY
Start: 1900-01-01

## 2023-09-22 RX ORDER — FINASTERIDE 5 MG/1
TAKE 1 TABLET (5 MG) BY ORAL ROUTE ONCE DAILY TABLET, FILM COATED ORAL 1
Qty: 0 | Refills: 0 | Status: ACTIVE | COMMUNITY
Start: 1900-01-01

## 2023-09-22 RX ORDER — FLUTICASONE PROPIONATE 50 UG/1
SPRAY 1 SPRAY (50 MCG) IN EACH NOSTRIL BY INTRANASAL ROUTE ONCE DAILY SPRAY, METERED NASAL 1
Qty: 1 | Refills: 0 | Status: ACTIVE | COMMUNITY
Start: 1900-01-01

## 2023-09-22 RX ORDER — COLESEVELAM 625 MG/1
TAKE 1 TO 3 TABLETS BY MOUTH DAILY WITH LUNCH TABLET, FILM COATED ORAL
Qty: 90 TABLET | Refills: 12 | Status: ACTIVE | COMMUNITY

## 2023-09-22 RX ORDER — CETIRIZINE HYDROCHLORIDE 10 MG/1
TAKE 1 TABLET (10 MG) BY ORAL ROUTE ONCE DAILY TABLET, FILM COATED ORAL 1
Qty: 0 | Refills: 0 | Status: ACTIVE | COMMUNITY
Start: 1900-01-01

## 2023-09-22 RX ORDER — VALACYCLOVIR HCL 500 MG
TAKE 1 TABLET (500 MG) BY ORAL ROUTE ONCE DAILY TABLET ORAL 1
Qty: 0 | Refills: 0 | Status: ACTIVE | COMMUNITY
Start: 1900-01-01

## 2023-09-22 RX ORDER — BUPROPION HCL 300 MG
TAKE 1 TABLET (300 MG) BY ORAL ROUTE ONCE DAILY TABLET, EXTENDED RELEASE 24 HR ORAL 1
Qty: 0 | Refills: 0 | Status: ACTIVE | COMMUNITY
Start: 1900-01-01

## 2023-09-22 RX ORDER — ADALIMUMAB 40MG/0.4ML
INJECT 1 PEN KIT SUBCUTANEOUS
Qty: 4 | Refills: 11 | Status: ACTIVE | COMMUNITY

## 2023-09-22 RX ORDER — PROPRANOLOL HYDROCHLORIDE 10 MG/1
TAKE 1 TABLET (10 MG) BY ORAL ROUTE  ONCE PER DAY TABLET ORAL
Qty: 0 | Refills: 0 | Status: ACTIVE | COMMUNITY
Start: 1900-01-01

## 2023-09-22 RX ORDER — AMITRIPTYLINE HYDROCHLORIDE 10 MG/1
TAKE 1 TO 2 TABLETS BY  MOUTH ONCE DAILY AT BEDTIME TABLET, FILM COATED ORAL
Qty: 180 TABLET | Refills: 3 | Status: ACTIVE | COMMUNITY

## 2023-09-22 RX ORDER — LISINOPRIL AND HYDROCHLOROTHIAZIDE TABLETS 20; 25 MG/1; MG/1
TAKE 1/2  TABLET BY ORAL ROUTE ONCE DAILY TABLET ORAL 1
Qty: 0 | Refills: 0 | Status: ACTIVE | COMMUNITY
Start: 1900-01-01

## 2023-09-22 NOTE — HPI-OTHER HISTORIES
PAST MEDICAL HISTORY: The patient stated he had a trip to [place] in Jan. and Feb 2017. He had gotten a parasite in his previous trips but these symptoms were different. He was having loose, watery diarrhea. Some days he could have formed stool. He could go up to 8 BMs in the day. He would usually have 5 BMs daily most in the AM. One pill of Imodium provided relief for the rest of the day. Stress and eating could exacerbate symptoms. He noted he had been under a lot of work stress and wondered if this was IBS. The patient stated that hyoscyamine had provided 50% relief of his abdominal pain. He localized his LLQ pain over his previous surgical scar. He noted seeing a cosmetic surgeon because of the unevenness of the sides of the scar, but they differed any further management to his original surgeon.   Review of his [place] [facility] chart noted an admit from 9/21/15 to 9/28/15 where he was noted to have a SB perforation. He had an exp lap with small bowel resection on 9/21/15. His later course was complicated by two intra-abdominal abscesses subsequently requiring IandD of peritoneal abscess with washout on 10/8/17. The cause of the small bowel perforation was not known. The patient had a colonoscopy on 4/28/17 which noted multiple ulcers in the TI and biopsies were significant for active ileitis. The patient had only taken Tylenol PRN since his resection. He denied NSAID use. He had a repeat colonoscopy on 8/11/17 which noted a few erosions in the TI consistent with ileal Crohn's; biopsies noted active enteritis with ulcer.   On visit 6/02/17, patient noted his bowel habit had improved and his abdominal pain was better after taking Cipro x 10 days. His ANCA/ASCA lab were negative for IBD. On visit 9/21/17, he stated he began having abdominal discomfort 1 x week and non-bloody diarrhea with a fever up to 101F on 9/18/17. He denied any upper GI symptoms with it. He was started on Cipro/Flagyl by Dr. Galvan. Then on visit 9/28/17 he stated his diarrhea had improved. He denied any watery diarrhea. He had 6 BMs before clinic visit with soft stool. He reported having abdominal pain on 9/26/17 with a temperature up to 100.8. He stated he woke up with a fever on 9/27/17 morning which resolved. On visit 10/10/17 he had finished his antibiotic treatment and stated that his BMs had improved with 4-5 BMs with formation but denied watery diarrhea. He also noted intermittent lower abdominal pain.   On visit 12/22/17 he stated he had no issues with diarrhea and denied abdominal pain. He had started the 6-MP 50 mg daily x 1 month and then decreased to 25 mg x 1 month because he had concerns that the 6-MP was increasing his susceptibility to having a cold. On 3/26/18, he stated he was doing well on 6-mp 50 mg daily. He denied significant diarrhea and abdominal pain.   On 6/28/18, the patient mentioned that last week his stomach felt sensitive and he experienced some cramping. He reported taking hyoscamine to help with the abdominal discomfort. He also reported his discomfort could be stress related due to stress from his job as an . The patient mentioned that he was doing well on the 6mp 50mg QD.  He stated he recently went to [facility] for work. During his travel, he experienced abdominal discomfort after eating the airplane food that was served. He also stated having one loose stool but no diarrhea after that. He was scheduled to return to [facility] next week. The patient denied any other GI symptoms.   A colonoscopy on 8/27/18 revealed a single ulcer and focal active enteritis in the terminal ileum.   On 9/24/18, it was discussed with the patient about increasing his 6-mp due to some inflammation still present in the ileum while on 6-mp 50 mg QD. The patient noted having to very rarely use hyoscamine. He denied any other GI symptoms.   On 10/15/18, the patient noted he was doing well on 6- mg QD. He had been experiencing a rash on his face in the past month and wanted to know if it was related to the higher dose of 6-MP. He denied any other GI symptoms.   On 12/5/18, He noted no improvement in his rash on 6-mp 50 mg daily so he discontinued 6-mp. Off for 1-2 weeks he did not have a rash.  He resumed at 50 mg and had a recurrent facial rash.  He had seen his dermatologist when the rash had resolved and had a follow-up the next week after the rash had reoccured.  He also followed with an allergist, Dr. Ángel Chew and he noted he is "allergic to everything" and is on allergy shots. He denied any GI symptoms. He stated that he would use the hyoscyamine maybe once a year.   On 1/16/19, he reported that his facial rash had not returned since his last visit and he was doing well. He saw a dermatologist who had no further recomendation. He denied any GI symptoms.  On 2/27/19, he said that starting Friday he had bloating, diarrhea, loss of appetite, and abdominal pain and these symptoms were persisting. He traveled to [facility] Wednesday and returned Friday night when he started feeling worse. He noted he had occasional nausea as well. He had not felt sick after previous trips to [facility]. He stopped taking 6-MP because he got a rash. He said he had taken prednisone in the past and believed he tolerated it.   On 3/20/19, he said he was having 1-2 BMs/day with occasional watery diarrhea. On Monday and Tuesday he noted having nausea and mild periumbilical pain. He took hyoscyamine SL 0.125 mg PRN which provided relief. He took ondansetron on Monday and said it worked for a while, but had to take it again a few hours later. He took Cipro 500 mg for 7 days continues on prednisone 20 mg QD and said this regimen had helped. He was going to start on Humira injections on Friday.  On 4/10/19, he said he had started Humira and denied having a reaction to it. He noted decreased BM frequency with 4-5 soft/loose BMs/day. He denied abdominal pain and said he was eating ok. He noted taking prednisone 20 mg QD had affected his sleep and made him more irritable. He also noted that the rash on his face came back when he reduced the prednisone to 10 mg daily on his own.  It then resolved when he went back up to 20 mg daily.  On 5/1/19, he said he was doing well. He was still tapering off prednisone and was on 5 mg QD. He noted 2-3 soft, not watery BMs QD. He denied abdominal pain and his appetite was good. He had not needed any hyoscyamine. He noted his face rash was slightly noticeable.  On 8/6/19, he said his facial rash was better after using a topical medicine from his dermatologist. He was getting Humira every 2 weeks. He noted episodes every 3 weeks that would last for 3 days with 5-6 watery BMs/day, or some days he would have no BM. He was off prednisone. He still took trazodone for insomnia which helped him sleep.   On 11/12/19, he was taking Welchol 625 mg 1 pill QD. He took it at lunch or dinner. He noted a benefit. He had 2-3 BMs/day. They were formed 80% of the time.  He hadn't had any recent lab work with Dr. Escamilla. His physical was at the end of December. Recently, he had traveled to places like [place], [place], and [place] [facility].   On 9/23/20, he said he had a flare-up a couple weeks ago where he was having 6 BMs QAM. He was having frequent formed stools with some LLQ pain. He usually had 2-3 formed or soft BMs/day without blood. He continued on Humira every 2 weeks (injects into his thigh).  On 12/8/20, he said he was in the middle of a "flare-up." He had 4-5 medium formed BMs/day with lower abdominal pain and urgency. Pain resolved after a BM he stated.  He denied watery diarrhea and he denied rectal bleeding. He was on Colesevelam 2 pills/day with lunch 3-4x/week - which did help to form his BMs. He had his adalimumab+ab levels checked last week - lab pending. He took trazadone 100 mg (cuts 150 mg pill) for insomnia.  On 1/19/21, he said he had 1 formed BM/day without blood. He denied abdominal pain.  He had started on amitriptyline 10 mg QHS and was now off Welchol/colesevelam. He continued on trazadone 1/2 pill QD and was trying to find the right dosage of amitriptyline - either 10 or 20 mg at bedtime. His facial seborrhea had resolved.  On 5/4/21, he said he continued on Humira every 2 weeks. He continued to have 1 BM/day without blood. He only had intermittent minor lower abdominal pain at his incision site last week. Pain was now resolved. He had increased to amitriptyline 10 mg 2 pills QHS. He was sleeping well on this medication. He continued on trazadone 0.5 pill QHS. He had not had a recurrence of facial seborrhea.  On 11/2/21, he said he was off Humira for 5-6 weeks due to receiving the COVID booster shot - he received the booster twice until he developed an immune response he stated. While off, he noted increased BM frequency, intermittent diarrhea (without blood), gas, and abdominal crampings. He had received 2 injections so far - now had 2 formed BMs QD. He was off it due to getting the COVID booster vaccine - actually received it a second time to see if there was an immune response. He continued on amitriptyline 10 mg 2 pills QHS and trazadone 0.5 pill QD.  On 7/6/22, he said he continued on Humira every 2 weeks - no diarrhea, bleeding, or abdominal pain. He continued on amitriptyline 10 mg 2 pills QHS.   He had started a new job and was on new insurance. He was told that Humira would cost $2500/month.  On 1/10/23, he said he continued on Humira [id number] - never forgot a dosage. His last dosage was on New Year's Madeline. He had daily mid-lower abdominal pain that resolved after having a BM. He continued on amitriptyline 10 mg 2 pills QHS.   HPI: Today, the patient presents in follow-up after his Humira frequency was increased to weekly on Jan 28 due to a low adalimumab level. He was off Humira for a month due to a sinus infection, but has since resumed. At the time he resumed, he was having abdominal pain and diarrhea (5-6 BMs before lunch, soft not watery), but no rectal bleeding. He plans to stop Humira again, this time for around 2 weeks, because he plans to get the COVID booster, flu, and RSV vaccine. His last Humira dose was last week. He continues on amitriptyline 10 mg 2 pills QHS.   Labs 1/13/23 - Adalimumab drug level 5.6/Ab <10. 7/6/22 - Quant-TB negative. CBC, CMP all normal. 11/2/21 - CBC, CMP all normal. 5/4/21 - CBC, CMP all normal. 12/2/20 - Adalimumab level 8.7 and antibody 74. 9/23/20 - Tb negative. CBC, CMP all normal.   3/29/19 - CBC, CMP, Lipids, B12, TSH, Vitamin D all normal. 2/27/19 - GI stool panel negative. CBC, CMP, and Vitamin B12 normal.  HCV Ab, HBsAG, and Quant Gold TB all negative. CRP 10.8, Hep B surface Ab reactive.  10/15/18 - CBC normal, CMP normal, 6-, 6-MMPN 2361 on 6-mp 100 mg daily. 6/28/18- CBC normal, CMP normal.  3/26/18- CBC normal, CMP normal, 6TG 84, 6MMPN 132 on 6-mp 50 mg daily.  12/22/17 - CBC/CMP normal. CRP 0.6.   9/28/17 CBC/CMP normal, CRP 52, sed rate 8.   9/2/17 Stool campylobacter/shiga toxin negative, C diff cx and toxin A/B negative, OandPx1 negative.  Stool salmonella/shigella pending. 9/6/17 [facility] - negative.   8/31/17 TPMT 23.7.   4/11/17 GI pathogen stool study negative, but does not test for Yersenia.   4/11/17 TSH/FT4 normal, CBC normal, CMP normal except for a sodium of 145.  ttg IgA negative, IgA level normal, sed rate 2/normal, CRP normal, lipase 47/normal.

## 2023-09-23 PROBLEM — 56689002 CROHN'S DISEASE OF SMALL INTESTINE: Status: ACTIVE | Noted: 2021-05-04

## 2023-09-29 LAB
A/G RATIO: 1.4
ABSOLUTE BASOPHILS: 32
ABSOLUTE EOSINOPHILS: 83
ABSOLUTE LYMPHOCYTES: 2861
ABSOLUTE MONOCYTES: 653
ABSOLUTE NEUTROPHILS: 2771
ALBUMIN: 3.6
ALKALINE PHOSPHATASE: 64
ALT (SGPT): 11
AST (SGOT): 12
BASOPHILS: 0.5
BILIRUBIN, TOTAL: 0.5
BUN/CREATININE RATIO: (no result)
BUN: 18
CALCIUM: 8.9
CARBON DIOXIDE, TOTAL: 27
CHLORIDE: 105
CREATININE: 0.97
EGFR: 88
EOSINOPHILS: 1.3
GLOBULIN, TOTAL: 2.5
GLUCOSE: 81
HEMATOCRIT: 44.3
HEMOGLOBIN: 15.1
LYMPHOCYTES: 44.7
MCH: 30.2
MCHC: 34.1
MCV: 88.6
MITOGEN-NIL: >10
MONOCYTES: 10.2
MPV: 11.5
NEUTROPHILS: 43.3
PLATELET COUNT: 191
POTASSIUM: 3.9
PROTEIN, TOTAL: 6.1
QUANTIFERON NIL VALUE: 0.11
QUANTIFERON TB1 AG VALUE: 0.02
QUANTIFERON TB2 AG VALUE: 0.01
QUANTIFERON-TB GOLD PLUS: NEGATIVE
RDW: 12.7
RED BLOOD CELL COUNT: 5
SODIUM: 143
WHITE BLOOD CELL COUNT: 6.4

## 2024-03-22 ENCOUNTER — OV EP (OUTPATIENT)
Dept: URBAN - METROPOLITAN AREA CLINIC 105 | Facility: CLINIC | Age: 64
End: 2024-03-22
Payer: COMMERCIAL

## 2024-03-22 ENCOUNTER — LAB (OUTPATIENT)
Dept: URBAN - METROPOLITAN AREA CLINIC 105 | Facility: CLINIC | Age: 64
End: 2024-03-22

## 2024-03-22 DIAGNOSIS — L21.9 SEBORRHEA: ICD-10-CM

## 2024-03-22 DIAGNOSIS — K58.9 IBS (IRRITABLE BOWEL SYNDROME): ICD-10-CM

## 2024-03-22 DIAGNOSIS — K50.00 CROHN'S DISEASE INVOLVING TERMINAL ILEUM: ICD-10-CM

## 2024-03-22 DIAGNOSIS — Z86.010 PERSONAL HISTORY OF COLON POLYPS: ICD-10-CM

## 2024-03-22 DIAGNOSIS — R19.7 DIARRHEA: ICD-10-CM

## 2024-03-22 PROCEDURE — 99214 OFFICE O/P EST MOD 30 MIN: CPT | Performed by: INTERNAL MEDICINE

## 2024-03-22 RX ORDER — ROSUVASTATIN CALCIUM 40 MG/1
TAKE 1 TABLET (40 MG) BY ORAL ROUTE ONCE DAILY TABLET, FILM COATED ORAL 1
Qty: 0 | Refills: 0 | Status: ACTIVE | COMMUNITY
Start: 1900-01-01

## 2024-03-22 RX ORDER — COLESEVELAM 625 MG/1
TAKE 1 TO 3 TABLETS BY MOUTH DAILY WITH LUNCH TABLET, FILM COATED ORAL
Qty: 90 TABLET | Refills: 12 | Status: ON HOLD | COMMUNITY

## 2024-03-22 RX ORDER — LISINOPRIL AND HYDROCHLOROTHIAZIDE TABLETS 20; 25 MG/1; MG/1
TAKE 1/2  TABLET BY ORAL ROUTE ONCE DAILY TABLET ORAL 1
Qty: 0 | Refills: 0 | Status: ACTIVE | COMMUNITY
Start: 1900-01-01

## 2024-03-22 RX ORDER — VALACYCLOVIR HCL 500 MG
TAKE 1 TABLET (500 MG) BY ORAL ROUTE ONCE DAILY TABLET ORAL 1
Qty: 0 | Refills: 0 | Status: ACTIVE | COMMUNITY
Start: 1900-01-01

## 2024-03-22 RX ORDER — BUPROPION HCL 300 MG
TAKE 1 TABLET (300 MG) BY ORAL ROUTE ONCE DAILY TABLET, EXTENDED RELEASE 24 HR ORAL 1
Qty: 0 | Refills: 0 | Status: ACTIVE | COMMUNITY
Start: 1900-01-01

## 2024-03-22 RX ORDER — FLUTICASONE PROPIONATE 50 UG/1
SPRAY 1 SPRAY (50 MCG) IN EACH NOSTRIL BY INTRANASAL ROUTE ONCE DAILY SPRAY, METERED NASAL 1
Qty: 1 | Refills: 0 | Status: ACTIVE | COMMUNITY
Start: 1900-01-01

## 2024-03-22 RX ORDER — AMITRIPTYLINE HYDROCHLORIDE 10 MG/1
TAKE 1 TO 2 TABLETS BY  MOUTH ONCE DAILY AT BEDTIME TABLET, FILM COATED ORAL
Qty: 180 TABLET | Refills: 3 | Status: ACTIVE | COMMUNITY

## 2024-03-22 RX ORDER — FINASTERIDE 5 MG/1
TAKE 1 TABLET (5 MG) BY ORAL ROUTE ONCE DAILY TABLET, FILM COATED ORAL 1
Qty: 0 | Refills: 0 | Status: ACTIVE | COMMUNITY
Start: 1900-01-01

## 2024-03-22 RX ORDER — ADALIMUMAB 40MG/0.4ML
INJECT 1 PEN KIT SUBCUTANEOUS
Qty: 4 | Refills: 11 | Status: ACTIVE | COMMUNITY

## 2024-03-22 RX ORDER — PROPRANOLOL HYDROCHLORIDE 10 MG/1
TAKE 1 TABLET (10 MG) BY ORAL ROUTE  ONCE PER DAY TABLET ORAL
Qty: 0 | Refills: 0 | Status: ACTIVE | COMMUNITY
Start: 1900-01-01

## 2024-03-22 RX ORDER — CETIRIZINE HYDROCHLORIDE 10 MG/1
TAKE 1 TABLET (10 MG) BY ORAL ROUTE ONCE DAILY TABLET, FILM COATED ORAL 1
Qty: 0 | Refills: 0 | Status: ACTIVE | COMMUNITY
Start: 1900-01-01

## 2024-03-22 RX ORDER — EZETIMIBE 10 MG/1
1 TABLET TABLET ORAL ONCE A DAY
Status: ACTIVE | COMMUNITY

## 2024-03-22 NOTE — HPI-OTHER HISTORIES
PAST MEDICAL HISTORY: The patient stated he had a trip to [place] in Jan. and Feb 2017. He had gotten a parasite in his previous trips but these symptoms were different. He was having loose, watery diarrhea. Some days he could have formed stool. He could go up to 8 BMs in the day. He would usually have 5 BMs daily most in the AM. One pill of Imodium provided relief for the rest of the day. Stress and eating could exacerbate symptoms. He noted he had been under a lot of work stress and wondered if this was IBS. The patient stated that hyoscyamine had provided 50% relief of his abdominal pain. He localized his LLQ pain over his previous surgical scar. He noted seeing a cosmetic surgeon because of the unevenness of the sides of the scar, but they differed any further management to his original surgeon.   Review of his [place] EPIC chart noted an admit from 9/21/15 to 9/28/15 where he was noted to have a SB perforation. He had an exp lap with small bowel resection on 9/21/15. His later course was complicated by two intra-abdominal abscesses subsequently requiring IandD of peritoneal abscess with washout on 10/8/17. The cause of the small bowel perforation was not known. The patient had a colonoscopy on 4/28/17 which noted multiple ulcers in the TI and biopsies were significant for active ileitis. The patient had only taken Tylenol PRN since his resection. He denied NSAID use. He had a repeat colonoscopy on 8/11/17 which noted a few erosions in the TI consistent with ileal Crohn's; biopsies noted active enteritis with ulcer.   On visit 6/02/17, patient noted his bowel habit had improved and his abdominal pain was better after taking Cipro x 10 days. His ANCA/ASCA lab were negative for IBD. On visit 9/21/17, he stated he began having abdominal discomfort 1 x week and non-bloody diarrhea with a fever up to 101F on 9/18/17. He denied any upper GI symptoms with it. He was started on Cipro/Flagyl by Dr. Galvan. Then on visit 9/28/17 he stated his diarrhea had improved. He denied any watery diarrhea. He had 6 BMs before clinic visit with soft stool. He reported having abdominal pain on 9/26/17 with a temperature up to 100.8. He stated he woke up with a fever on 9/27/17 morning which resolved. On visit 10/10/17 he had finished his antibiotic treatment and stated that his BMs had improved with 4-5 BMs with formation but denied watery diarrhea. He also noted intermittent lower abdominal pain.   On visit 12/22/17 he stated he had no issues with diarrhea and denied abdominal pain. He had started the 6-MP 50 mg daily x 1 month and then decreased to 25 mg x 1 month because he had concerns that the 6-MP was increasing his susceptibility to having a cold. On 3/26/18, he stated he was doing well on 6-mp 50 mg daily. He denied significant diarrhea and abdominal pain.   On 6/28/18, the patient mentioned that last week his stomach felt sensitive and he experienced some cramping. He reported taking hyoscamine to help with the abdominal discomfort. He also reported his discomfort could be stress related due to stress from his job as an . The patient mentioned that he was doing well on the 6mp 50mg QD.  He stated he recently went to [facility] for work. During his travel, he experienced abdominal discomfort after eating the airplane food that was served. He also stated having one loose stool but no diarrhea after that. He was scheduled to return to [facility] next week. The patient denied any other GI symptoms.   A colonoscopy on 8/27/18 revealed a single ulcer and focal active enteritis in the terminal ileum.   On 9/24/18, it was discussed with the patient about increasing his 6-mp due to some inflammation still present in the ileum while on 6-mp 50 mg QD. The patient noted having to very rarely use hyoscamine. He denied any other GI symptoms.   On 10/15/18, the patient noted he was doing well on 6- mg QD. He had been experiencing a rash on his face in the past month and wanted to know if it was related to the higher dose of 6-MP. He denied any other GI symptoms.   On 12/5/18, He noted no improvement in his rash on 6-mp 50 mg daily so he discontinued 6-mp. Off for 1-2 weeks he did not have a rash.  He resumed at 50 mg and had a recurrent facial rash.  He had seen his dermatologist when the rash had resolved and had a follow-up the next week after the rash had reoccured.  He also followed with an allergist, Dr. Ángel Chew and he noted he is "allergic to everything" and is on allergy shots. He denied any GI symptoms. He stated that he would use the hyoscyamine maybe once a year.   On 1/16/19, he reported that his facial rash had not returned since his last visit and he was doing well. He saw a dermatologist who had no further recomendation. He denied any GI symptoms.  On 2/27/19, he said that starting Friday he had bloating, diarrhea, loss of appetite, and abdominal pain and these symptoms were persisting. He traveled to [facility] Wednesday and returned Friday night when he started feeling worse. He noted he had occasional nausea as well. He had not felt sick after previous trips to [facility]. He stopped taking 6-MP because he got a rash. He said he had taken prednisone in the past and believed he tolerated it.   On 3/20/19, he said he was having 1-2 BMs/day with occasional watery diarrhea. On Monday and Tuesday he noted having nausea and mild periumbilical pain. He took hyoscyamine SL 0.125 mg PRN which provided relief. He took ondansetron on Monday and said it worked for a while, but had to take it again a few hours later. He took Cipro 500 mg for 7 days continues on prednisone 20 mg QD and said this regimen had helped. He was going to start on Humira injections on Friday.  On 4/10/19, he said he had started Humira and denied having a reaction to it. He noted decreased BM frequency with 4-5 soft/loose BMs/day. He denied abdominal pain and said he was eating ok. He noted taking prednisone 20 mg QD had affected his sleep and made him more irritable. He also noted that the rash on his face came back when he reduced the prednisone to 10 mg daily on his own.  It then resolved when he went back up to 20 mg daily.  On 5/1/19, he said he was doing well. He was still tapering off prednisone and was on 5 mg QD. He noted 2-3 soft, not watery BMs QD. He denied abdominal pain and his appetite was good. He had not needed any hyoscyamine. He noted his face rash was slightly noticeable.  On 8/6/19, he said his facial rash was better after using a topical medicine from his dermatologist. He was getting Humira every 2 weeks. He noted episodes every 3 weeks that would last for 3 days with 5-6 watery BMs/day, or some days he would have no BM. He was off prednisone. He still took trazodone for insomnia which helped him sleep.   On 11/12/19, he was taking Welchol 625 mg 1 pill QD. He took it at lunch or dinner. He noted a benefit. He had 2-3 BMs/day. They were formed 80% of the time.  He hadn't had any recent lab work with Dr. Escamilla. His physical was at the end of December. Recently, he had traveled to places like [place], [place], and [place] [facility].   On 9/23/20, he said he had a flare-up a couple weeks ago where he was having 6 BMs QAM. He was having frequent formed stools with some LLQ pain. He usually had 2-3 formed or soft BMs/day without blood. He continued on Humira every 2 weeks (injects into his thigh).  On 12/8/20, he said he was in the middle of a "flare-up." He had 4-5 medium formed BMs/day with lower abdominal pain and urgency. Pain resolved after a BM he stated.  He denied watery diarrhea and he denied rectal bleeding. He was on Colesevelam 2 pills/day with lunch 3-4x/week - which did help to form his BMs. He had his adalimumab+ab levels checked last week - lab pending. He took trazadone 100 mg (cuts 150 mg pill) for insomnia.  On 1/19/21, he said he had 1 formed BM/day without blood. He denied abdominal pain.  He had started on amitriptyline 10 mg QHS and was now off Welchol/colesevelam. He continued on trazadone 1/2 pill QD and was trying to find the right dosage of amitriptyline - either 10 or 20 mg at bedtime. His facial seborrhea had resolved.  On 5/4/21, he said he continued on Humira every 2 weeks. He continued to have 1 BM/day without blood. He only had intermittent minor lower abdominal pain at his incision site last week. Pain was now resolved. He had increased to amitriptyline 10 mg 2 pills QHS. He was sleeping well on this medication. He continued on trazadone 0.5 pill QHS. He had not had a recurrence of facial seborrhea.  On 11/2/21, he said he was off Humira for 5-6 weeks due to receiving the COVID booster shot - he received the booster twice until he developed an immune response he stated. While off, he noted increased BM frequency, intermittent diarrhea (without blood), gas, and abdominal crampings. He had received 2 injections so far - now had 2 formed BMs QD. He was off it due to getting the COVID booster vaccine - actually received it a second time to see if there was an immune response. He continued on amitriptyline 10 mg 2 pills QHS and trazadone 0.5 pill QD.  On 7/6/22, he said he continued on Humira every 2 weeks - no diarrhea, bleeding, or abdominal pain. He continued on amitriptyline 10 mg 2 pills QHS.   He had started a new job and was on new insurance. He was told that Humira would cost $2500/month.  On 1/10/23, he said he continued on Humira [id number] - never forgot a dosage. His last dosage was on [employer]. He had daily mid-lower abdominal pain that resolved after having a BM. He continued on amitriptyline 10 mg 2 pills QHS.   On 9/22/23, the patient presented in follow-up after his Humira frequency was increased to weekly on Jan 28 due to a low adalimumab level. He was off Humira for a month due to a sinus infection, but had since resumed. At the time he resumed, he was having abdominal pain and diarrhea (5-6 BMs before lunch, soft not watery), but no rectal bleeding. He planned to stop Humira again, this time for around 2 weeks, because he planned to get the COVID booster, flu, and RSV vaccine. His last Humira dose was last week. He continued on amitriptyline 10 mg 2 pills QHS.   HPI: Today, he says he continues on weekly Humira - no diarrhea, abdominal pain, or rectal bleeding. He never forgets a dose. Last Humira injection was 6 days ago. He continues on amitriptyline 10 mg 2 pills QHS. He is not taking Colesevelam.  Labs 9/22/23 - CBC, CMP all normal. Quant-TB negative. 1/13/23 - Adalimumab drug level 5.6/Ab <10. 7/6/22 - Quant-TB negative. CBC, CMP all normal. 11/2/21 - CBC, CMP all normal. 5/4/21 - CBC, CMP all normal. 12/2/20 - Adalimumab level 8.7 and antibody 74. 9/23/20 - Tb negative. CBC, CMP all normal.   3/29/19 - CBC, CMP, Lipids, B12, TSH, Vitamin D all normal. 2/27/19 - GI stool panel negative. CBC, CMP, and Vitamin B12 normal.  HCV Ab, HBsAG, and Quant Gold TB all negative. CRP 10.8, Hep B surface Ab reactive.  10/15/18 - CBC normal, CMP normal, 6-, 6-MMPN 2361 on 6-mp 100 mg daily. 6/28/18- CBC normal, CMP normal.  3/26/18- CBC normal, CMP normal, 6TG 84, 6MMPN 132 on 6-mp 50 mg daily.  12/22/17 - CBC/CMP normal. CRP 0.6.   9/28/17 CBC/CMP normal, CRP 52, sed rate 8.   9/2/17 Stool campylobacter/shiga toxin negative, C diff cx and toxin A/B negative, OandPx1 negative.  Stool salmonella/shigella pending. 9/6/17 [facility] - negative.   8/31/17 TPMT 23.7.   4/11/17 GI pathogen stool study negative, but does not test for Yersenia.   4/11/17 TSH/FT4 normal, CBC normal, CMP normal except for a sodium of 145.  ttg IgA negative, IgA level normal, sed rate 2/normal, CRP normal, lipase 47/normal.

## 2024-08-05 ENCOUNTER — ERX REFILL RESPONSE (OUTPATIENT)
Dept: URBAN - METROPOLITAN AREA CLINIC 105 | Facility: CLINIC | Age: 64
End: 2024-08-05

## 2024-08-05 RX ORDER — AMITRIPTYLINE HYDROCHLORIDE 10 MG/1
TAKE 1 TO 2 TABLETS BY  MOUTH ONCE DAILY AT BEDTIME TABLET, FILM COATED ORAL
Qty: 180 TABLET | Refills: 3 | OUTPATIENT

## 2024-09-27 ENCOUNTER — OFFICE VISIT (OUTPATIENT)
Dept: URBAN - METROPOLITAN AREA CLINIC 105 | Facility: CLINIC | Age: 64
End: 2024-09-27

## 2024-09-27 NOTE — HPI-OTHER HISTORIES
PAST MEDICAL HISTORY: The patient stated he had a trip to [place] in Jan. and Feb 2017. He had gotten a parasite in his previous trips but these symptoms were different. He was having loose, watery diarrhea. Some days he could have formed stool. He could go up to 8 BMs in the day. He would usually have 5 BMs daily most in the AM. One pill of Imodium provided relief for the rest of the day. Stress and eating could exacerbate symptoms. He noted he had been under a lot of work stress and wondered if this was IBS. The patient stated that hyoscyamine had provided 50% relief of his abdominal pain. He localized his LLQ pain over his previous surgical scar. He noted seeing a cosmetic surgeon because of the unevenness of the sides of the scar, but they differed any further management to his original surgeon.   Review of his [place] EPIC chart noted an admit from 9/21/15 to 9/28/15 where he was noted to have a SB perforation. He had an exp lap with small bowel resection on 9/21/15. His later course was complicated by two intra-abdominal abscesses subsequently requiring IandD of peritoneal abscess with washout on 10/8/17. The cause of the small bowel perforation was not known. The patient had a colonoscopy on 4/28/17 which noted multiple ulcers in the TI and biopsies were significant for active ileitis. The patient had only taken Tylenol PRN since his resection. He denied NSAID use. He had a repeat colonoscopy on 8/11/17 which noted a few erosions in the TI consistent with ileal Crohn's; biopsies noted active enteritis with ulcer.   On visit 6/02/17, patient noted his bowel habit had improved and his abdominal pain was better after taking Cipro x 10 days. His ANCA/ASCA lab were negative for IBD. On visit 9/21/17, he stated he began having abdominal discomfort 1 x week and non-bloody diarrhea with a fever up to 101F on 9/18/17. He denied any upper GI symptoms with it. He was started on Cipro/Flagyl by Dr. Galvan. Then on visit 9/28/17 he stated his diarrhea had improved. He denied any watery diarrhea. He had 6 BMs before clinic visit with soft stool. He reported having abdominal pain on 9/26/17 with a temperature up to 100.8. He stated he woke up with a fever on 9/27/17 morning which resolved. On visit 10/10/17 he had finished his antibiotic treatment and stated that his BMs had improved with 4-5 BMs with formation but denied watery diarrhea. He also noted intermittent lower abdominal pain.   On visit 12/22/17 he stated he had no issues with diarrhea and denied abdominal pain. He had started the 6-MP 50 mg daily x 1 month and then decreased to 25 mg x 1 month because he had concerns that the 6-MP was increasing his susceptibility to having a cold. On 3/26/18, he stated he was doing well on 6-mp 50 mg daily. He denied significant diarrhea and abdominal pain.   On 6/28/18, the patient mentioned that last week his stomach felt sensitive and he experienced some cramping. He reported taking hyoscamine to help with the abdominal discomfort. He also reported his discomfort could be stress related due to stress from his job as an . The patient mentioned that he was doing well on the 6mp 50mg QD.  He stated he recently went to [facility] for work. During his travel, he experienced abdominal discomfort after eating the airplane food that was served. He also stated having one loose stool but no diarrhea after that. He was scheduled to return to [facility] next week. The patient denied any other GI symptoms.   A colonoscopy on 8/27/18 revealed a single ulcer and focal active enteritis in the terminal ileum.   On 9/24/18, it was discussed with the patient about increasing his 6-mp due to some inflammation still present in the ileum while on 6-mp 50 mg QD. The patient noted having to very rarely use hyoscamine. He denied any other GI symptoms.   On 10/15/18, the patient noted he was doing well on 6- mg QD. He had been experiencing a rash on his face in the past month and wanted to know if it was related to the higher dose of 6-MP. He denied any other GI symptoms.   On 12/5/18, He noted no improvement in his rash on 6-mp 50 mg daily so he discontinued 6-mp. Off for 1-2 weeks he did not have a rash.  He resumed at 50 mg and had a recurrent facial rash.  He had seen his dermatologist when the rash had resolved and had a follow-up the next week after the rash had reoccured.  He also followed with an allergist, Dr. Ángel Chew and he noted he is "allergic to everything" and is on allergy shots. He denied any GI symptoms. He stated that he would use the hyoscyamine maybe once a year.   On 1/16/19, he reported that his facial rash had not returned since his last visit and he was doing well. He saw a dermatologist who had no further recomendation. He denied any GI symptoms.  On 2/27/19, he said that starting Friday he had bloating, diarrhea, loss of appetite, and abdominal pain and these symptoms were persisting. He traveled to [facility] Wednesday and returned Friday night when he started feeling worse. He noted he had occasional nausea as well. He had not felt sick after previous trips to [facility]. He stopped taking 6-MP because he got a rash. He said he had taken prednisone in the past and believed he tolerated it.   On 3/20/19, he said he was having 1-2 BMs/day with occasional watery diarrhea. On Monday and Tuesday he noted having nausea and mild periumbilical pain. He took hyoscyamine SL 0.125 mg PRN which provided relief. He took ondansetron on Monday and said it worked for a while, but had to take it again a few hours later. He took Cipro 500 mg for 7 days continues on prednisone 20 mg QD and said this regimen had helped. He was going to start on Humira injections on Friday.  On 4/10/19, he said he had started Humira and denied having a reaction to it. He noted decreased BM frequency with 4-5 soft/loose BMs/day. He denied abdominal pain and said he was eating ok. He noted taking prednisone 20 mg QD had affected his sleep and made him more irritable. He also noted that the rash on his face came back when he reduced the prednisone to 10 mg daily on his own.  It then resolved when he went back up to 20 mg daily.  On 5/1/19, he said he was doing well. He was still tapering off prednisone and was on 5 mg QD. He noted 2-3 soft, not watery BMs QD. He denied abdominal pain and his appetite was good. He had not needed any hyoscyamine. He noted his face rash was slightly noticeable.  On 8/6/19, he said his facial rash was better after using a topical medicine from his dermatologist. He was getting Humira every 2 weeks. He noted episodes every 3 weeks that would last for 3 days with 5-6 watery BMs/day, or some days he would have no BM. He was off prednisone. He still took trazodone for insomnia which helped him sleep.   On 11/12/19, he was taking Welchol 625 mg 1 pill QD. He took it at lunch or dinner. He noted a benefit. He had 2-3 BMs/day. They were formed 80% of the time.  He hadn't had any recent lab work with Dr. Escamilla. His physical was at the end of December. Recently, he had traveled to places like [place], [place], and [place] [facility].   On 9/23/20, he said he had a flare-up a couple weeks ago where he was having 6 BMs QAM. He was having frequent formed stools with some LLQ pain. He usually had 2-3 formed or soft BMs/day without blood. He continued on Humira every 2 weeks (injects into his thigh).  On 12/8/20, he said he was in the middle of a "flare-up." He had 4-5 medium formed BMs/day with lower abdominal pain and urgency. Pain resolved after a BM he stated.  He denied watery diarrhea and he denied rectal bleeding. He was on Colesevelam 2 pills/day with lunch 3-4x/week - which did help to form his BMs. He had his adalimumab+ab levels checked last week - lab pending. He took trazadone 100 mg (cuts 150 mg pill) for insomnia.  On 1/19/21, he said he had 1 formed BM/day without blood. He denied abdominal pain.  He had started on amitriptyline 10 mg QHS and was now off Welchol/colesevelam. He continued on trazadone 1/2 pill QD and was trying to find the right dosage of amitriptyline - either 10 or 20 mg at bedtime. His facial seborrhea had resolved.  On 5/4/21, he said he continued on Humira every 2 weeks. He continued to have 1 BM/day without blood. He only had intermittent minor lower abdominal pain at his incision site last week. Pain was now resolved. He had increased to amitriptyline 10 mg 2 pills QHS. He was sleeping well on this medication. He continued on trazadone 0.5 pill QHS. He had not had a recurrence of facial seborrhea.  On 11/2/21, he said he was off Humira for 5-6 weeks due to receiving the COVID booster shot - he received the booster twice until he developed an immune response he stated. While off, he noted increased BM frequency, intermittent diarrhea (without blood), gas, and abdominal crampings. He had received 2 injections so far - now had 2 formed BMs QD. He was off it due to getting the COVID booster vaccine - actually received it a second time to see if there was an immune response. He continued on amitriptyline 10 mg 2 pills QHS and trazadone 0.5 pill QD.  On 7/6/22, he said he continued on Humira every 2 weeks - no diarrhea, bleeding, or abdominal pain. He continued on amitriptyline 10 mg 2 pills QHS.   He had started a new job and was on new insurance. He was told that Humira would cost $2500/month.  On 1/10/23, he said he continued on Humira [id number] - never forgot a dosage. His last dosage was on [employer]. He had daily mid-lower abdominal pain that resolved after having a BM. He continued on amitriptyline 10 mg 2 pills QHS.   On 9/22/23, the patient presented in follow-up after his Humira frequency was increased to weekly on Jan 28 due to a low adalimumab level. He was off Humira for a month due to a sinus infection, but had since resumed. At the time he resumed, he was having abdominal pain and diarrhea (5-6 BMs before lunch, soft not watery), but no rectal bleeding. He planned to stop Humira again, this time for around 2 weeks, because he planned to get the COVID booster, flu, and RSV vaccine. His last Humira dose was last week. He continued on amitriptyline 10 mg 2 pills QHS.   On 3/22/24, he said he continued on weekly Humira - no diarrhea, abdominal pain, or rectal bleeding. He never forgot a dose. Last Humira injection was 6 days ago. He continued on amitriptyline 10 mg 2 pills QHS. He was not taking Colesevelam.  Labs 9/22/23 - CBC, CMP all normal. Quant-TB negative. 1/13/23 - Adalimumab drug level 5.6/Ab <10. 7/6/22 - Quant-TB negative. CBC, CMP all normal. 11/2/21 - CBC, CMP all normal. 5/4/21 - CBC, CMP all normal. 12/2/20 - Adalimumab level 8.7 and antibody 74. 9/23/20 - Tb negative. CBC, CMP all normal.   3/29/19 - CBC, CMP, Lipids, B12, TSH, Vitamin D all normal. 2/27/19 - GI stool panel negative. CBC, CMP, and Vitamin B12 normal.  HCV Ab, HBsAG, and Quant Gold TB all negative. CRP 10.8, Hep B surface Ab reactive.  10/15/18 - CBC normal, CMP normal, 6-, 6-MMPN 2361 on 6-mp 100 mg daily. 6/28/18- CBC normal, CMP normal.  3/26/18- CBC normal, CMP normal, 6TG 84, 6MMPN 132 on 6-mp 50 mg daily.  12/22/17 - CBC/CMP normal. CRP 0.6.   9/28/17 CBC/CMP normal, CRP 52, sed rate 8.   9/2/17 Stool campylobacter/shiga toxin negative, C diff cx and toxin A/B negative, OandPx1 negative.  Stool salmonella/shigella pending. 9/6/17 [facility] - negative.   8/31/17 TPMT 23.7.   4/11/17 GI pathogen stool study negative, but does not test for Yersenia.   4/11/17 TSH/FT4 normal, CBC normal, CMP normal except for a sodium of 145.  ttg IgA negative, IgA level normal, sed rate 2/normal, CRP normal, lipase 47/normal.

## 2024-10-01 ENCOUNTER — OFFICE VISIT (OUTPATIENT)
Dept: URBAN - METROPOLITAN AREA CLINIC 105 | Facility: CLINIC | Age: 64
End: 2024-10-01
Payer: COMMERCIAL

## 2024-10-01 ENCOUNTER — DASHBOARD ENCOUNTERS (OUTPATIENT)
Age: 64
End: 2024-10-01

## 2024-10-01 VITALS
TEMPERATURE: 97.2 F | HEIGHT: 71 IN | SYSTOLIC BLOOD PRESSURE: 115 MMHG | WEIGHT: 216.8 LBS | DIASTOLIC BLOOD PRESSURE: 79 MMHG | BODY MASS INDEX: 30.35 KG/M2 | HEART RATE: 80 BPM

## 2024-10-01 DIAGNOSIS — K58.8 OTHER IRRITABLE BOWEL SYNDROME: ICD-10-CM

## 2024-10-01 DIAGNOSIS — R19.7 DIARRHEA: ICD-10-CM

## 2024-10-01 DIAGNOSIS — L21.9 SEBORRHEA: ICD-10-CM

## 2024-10-01 DIAGNOSIS — Z86.0100 HISTORY OF COLON POLYPS: ICD-10-CM

## 2024-10-01 DIAGNOSIS — K50.00 CROHN'S DISEASE INVOLVING TERMINAL ILEUM: ICD-10-CM

## 2024-10-01 PROCEDURE — 99214 OFFICE O/P EST MOD 30 MIN: CPT | Performed by: INTERNAL MEDICINE

## 2024-10-01 RX ORDER — VALACYCLOVIR HCL 500 MG
TAKE 1 TABLET (500 MG) BY ORAL ROUTE ONCE DAILY TABLET ORAL 1
Qty: 0 | Refills: 0 | Status: ACTIVE | COMMUNITY
Start: 1900-01-01

## 2024-10-01 RX ORDER — COLESEVELAM 625 MG/1
TAKE 1 TO 3 TABLETS BY MOUTH DAILY WITH LUNCH TABLET, FILM COATED ORAL
Qty: 90 TABLET | Refills: 12 | Status: ON HOLD | COMMUNITY

## 2024-10-01 RX ORDER — CETIRIZINE HYDROCHLORIDE 10 MG/1
TAKE 1 TABLET (10 MG) BY ORAL ROUTE ONCE DAILY TABLET, FILM COATED ORAL 1
Qty: 0 | Refills: 0 | Status: ACTIVE | COMMUNITY
Start: 1900-01-01

## 2024-10-01 RX ORDER — ROSUVASTATIN CALCIUM 40 MG/1
TAKE 1 TABLET (40 MG) BY ORAL ROUTE ONCE DAILY TABLET, FILM COATED ORAL 1
Qty: 0 | Refills: 0 | Status: ACTIVE | COMMUNITY
Start: 1900-01-01

## 2024-10-01 RX ORDER — FINASTERIDE 5 MG/1
TAKE 1 TABLET (5 MG) BY ORAL ROUTE ONCE DAILY TABLET, FILM COATED ORAL 1
Qty: 0 | Refills: 0 | Status: ACTIVE | COMMUNITY
Start: 1900-01-01

## 2024-10-01 RX ORDER — BUPROPION HCL 300 MG
TAKE 1 TABLET (300 MG) BY ORAL ROUTE ONCE DAILY TABLET, EXTENDED RELEASE 24 HR ORAL 1
Qty: 0 | Refills: 0 | Status: ACTIVE | COMMUNITY
Start: 1900-01-01

## 2024-10-01 RX ORDER — EZETIMIBE 10 MG/1
1 TABLET TABLET ORAL ONCE A DAY
Status: ACTIVE | COMMUNITY

## 2024-10-01 RX ORDER — AMITRIPTYLINE HYDROCHLORIDE 10 MG/1
TAKE 1 TO 2 TABLETS BY  MOUTH ONCE DAILY AT BEDTIME TABLET, FILM COATED ORAL
Qty: 180 TABLET | Refills: 3 | Status: ACTIVE | COMMUNITY

## 2024-10-01 RX ORDER — PROPRANOLOL HYDROCHLORIDE 10 MG/1
TAKE 1 TABLET (10 MG) BY ORAL ROUTE  ONCE PER DAY TABLET ORAL
Qty: 0 | Refills: 0 | Status: ACTIVE | COMMUNITY
Start: 1900-01-01

## 2024-10-01 RX ORDER — ADALIMUMAB 40MG/0.4ML
INJECT 1 PEN KIT SUBCUTANEOUS
Qty: 4 | Refills: 11 | Status: ACTIVE | COMMUNITY

## 2024-10-01 RX ORDER — FLUTICASONE PROPIONATE 50 UG/1
SPRAY 1 SPRAY (50 MCG) IN EACH NOSTRIL BY INTRANASAL ROUTE ONCE DAILY SPRAY, METERED NASAL 1
Qty: 1 | Refills: 0 | Status: ACTIVE | COMMUNITY
Start: 1900-01-01

## 2024-10-01 RX ORDER — LISINOPRIL AND HYDROCHLOROTHIAZIDE TABLETS 20; 25 MG/1; MG/1
TAKE 1/2  TABLET BY ORAL ROUTE ONCE DAILY TABLET ORAL 1
Qty: 0 | Refills: 0 | Status: ACTIVE | COMMUNITY
Start: 1900-01-01

## 2024-10-01 NOTE — HPI-OTHER HISTORIES
PAST MEDICAL HISTORY: The patient stated he had a trip to [place] in Jan. and Feb 2017. He had gotten a parasite in his previous trips but these symptoms were different. He was having loose, watery diarrhea. Some days he could have formed stool. He could go up to 8 BMs in the day. He would usually have 5 BMs daily most in the AM. One pill of Imodium provided relief for the rest of the day. Stress and eating could exacerbate symptoms. He noted he had been under a lot of work stress and wondered if this was IBS. The patient stated that hyoscyamine had provided 50% relief of his abdominal pain. He localized his LLQ pain over his previous surgical scar. He noted seeing a cosmetic surgeon because of the unevenness of the sides of the scar, but they differed any further management to his original surgeon.   Review of his [facility] chart noted an admit from 9/21/15 to 9/28/15 where he was noted to have a SB perforation. He had an exp lap with small bowel resection on 9/21/15. His later course was complicated by two intra-abdominal abscesses subsequently requiring IandD of peritoneal abscess with washout on 10/8/17. The cause of the small bowel perforation was not known. The patient had a colonoscopy on 4/28/17 which noted multiple ulcers in the TI and biopsies were significant for active ileitis. The patient had only taken Tylenol PRN since his resection. He denied NSAID use. He had a repeat colonoscopy on 8/11/17 which noted a few erosions in the TI consistent with ileal Crohn's; biopsies noted active enteritis with ulcer.   On visit 6/02/17, patient noted his bowel habit had improved and his abdominal pain was better after taking Cipro x 10 days. His ANCA/ASCA lab were negative for IBD. On visit 9/21/17, he stated he began having abdominal discomfort 1 x week and non-bloody diarrhea with a fever up to 101F on 9/18/17. He denied any upper GI symptoms with it. He was started on Cipro/Flagyl by Dr. Galvan. Then on visit 9/28/17 he stated his diarrhea had improved. He denied any watery diarrhea. He had 6 BMs before clinic visit with soft stool. He reported having abdominal pain on 9/26/17 with a temperature up to 100.8. He stated he woke up with a fever on 9/27/17 morning which resolved. On visit 10/10/17 he had finished his antibiotic treatment and stated that his BMs had improved with 4-5 BMs with formation but denied watery diarrhea. He also noted intermittent lower abdominal pain.   On visit 12/22/17 he stated he had no issues with diarrhea and denied abdominal pain. He had started the 6-MP 50 mg daily x 1 month and then decreased to 25 mg x 1 month because he had concerns that the 6-MP was increasing his susceptibility to having a cold. On 3/26/18, he stated he was doing well on 6-mp 50 mg daily. He denied significant diarrhea and abdominal pain.   On 6/28/18, the patient mentioned that last week his stomach felt sensitive and he experienced some cramping. He reported taking hyoscamine to help with the abdominal discomfort. He also reported his discomfort could be stress related due to stress from his job as an . The patient mentioned that he was doing well on the 6mp 50mg QD.  He stated he recently went to [facility] for work. During his travel, he experienced abdominal discomfort after eating the airplane food that was served. He also stated having one loose stool but no diarrhea after that. He was scheduled to return to [facility] next week. The patient denied any other GI symptoms.   A colonoscopy on 8/27/18 revealed a single ulcer and focal active enteritis in the terminal ileum.   On 9/24/18, it was discussed with the patient about increasing his 6-mp due to some inflammation still present in the ileum while on 6-mp 50 mg QD. The patient noted having to very rarely use hyoscamine. He denied any other GI symptoms.   On 10/15/18, the patient noted he was doing well on 6- mg QD. He had been experiencing a rash on his face in the past month and wanted to know if it was related to the higher dose of 6-MP. He denied any other GI symptoms.   On 12/5/18, He noted no improvement in his rash on 6-mp 50 mg daily so he discontinued 6-mp. Off for 1-2 weeks he did not have a rash.  He resumed at 50 mg and had a recurrent facial rash.  He had seen his dermatologist when the rash had resolved and had a follow-up the next week after the rash had reoccured.  He also followed with an allergist, Dr. Ángel Chew and he noted he is "allergic to everything" and is on allergy shots. He denied any GI symptoms. He stated that he would use the hyoscyamine maybe once a year.   On 1/16/19, he reported that his facial rash had not returned since his last visit and he was doing well. He saw a dermatologist who had no further recomendation. He denied any GI symptoms.  On 2/27/19, he said that starting Friday he had bloating, diarrhea, loss of appetite, and abdominal pain and these symptoms were persisting. He traveled to [facility] Wednesday and returned Friday night when he started feeling worse. He noted he had occasional nausea as well. He had not felt sick after previous trips to [facility]. He stopped taking 6-MP because he got a rash. He said he had taken prednisone in the past and believed he tolerated it.   On 3/20/19, he said he was having 1-2 BMs/day with occasional watery diarrhea. On Monday and Tuesday he noted having nausea and mild periumbilical pain. He took hyoscyamine SL 0.125 mg PRN which provided relief. He took ondansetron on Monday and said it worked for a while, but had to take it again a few hours later. He took Cipro 500 mg for 7 days continues on prednisone 20 mg QD and said this regimen had helped. He was going to start on Humira injections on Friday.  On 4/10/19, he said he had started Humira and denied having a reaction to it. He noted decreased BM frequency with 4-5 soft/loose BMs/day. He denied abdominal pain and said he was eating ok. He noted taking prednisone 20 mg QD had affected his sleep and made him more irritable. He also noted that the rash on his face came back when he reduced the prednisone to 10 mg daily on his own.  It then resolved when he went back up to 20 mg daily.  On 5/1/19, he said he was doing well. He was still tapering off prednisone and was on 5 mg QD. He noted 2-3 soft, not watery BMs QD. He denied abdominal pain and his appetite was good. He had not needed any hyoscyamine. He noted his face rash was slightly noticeable.  On 8/6/19, he said his facial rash was better after using a topical medicine from his dermatologist. He was getting Humira every 2 weeks. He noted episodes every 3 weeks that would last for 3 days with 5-6 watery BMs/day, or some days he would have no BM. He was off prednisone. He still took trazodone for insomnia which helped him sleep.   On 11/12/19, he was taking Welchol 625 mg 1 pill QD. He took it at lunch or dinner. He noted a benefit. He had 2-3 BMs/day. They were formed 80% of the time.  He hadn't had any recent lab work with Dr. Escamilla. His physical was at the end of December. Recently, he had traveled to places like [place], [place], and [place] [facility].   On 9/23/20, he said he had a flare-up a couple weeks ago where he was having 6 BMs QAM. He was having frequent formed stools with some LLQ pain. He usually had 2-3 formed or soft BMs/day without blood. He continued on Humira every 2 weeks (injects into his thigh).  On 12/8/20, he said he was in the middle of a "flare-up." He had 4-5 medium formed BMs/day with lower abdominal pain and urgency. Pain resolved after a BM he stated.  He denied watery diarrhea and he denied rectal bleeding. He was on Colesevelam 2 pills/day with lunch 3-4x/week - which did help to form his BMs. He had his adalimumab+ab levels checked last week - lab pending. He took trazadone 100 mg (cuts 150 mg pill) for insomnia.  On 1/19/21, he said he had 1 formed BM/day without blood. He denied abdominal pain.  He had started on amitriptyline 10 mg QHS and was now off Welchol/colesevelam. He continued on trazadone 1/2 pill QD and was trying to find the right dosage of amitriptyline - either 10 or 20 mg at bedtime. His facial seborrhea had resolved.  On 5/4/21, he said he continued on Humira every 2 weeks. He continued to have 1 BM/day without blood. He only had intermittent minor lower abdominal pain at his incision site last week. Pain was now resolved. He had increased to amitriptyline 10 mg 2 pills QHS. He was sleeping well on this medication. He continued on trazadone 0.5 pill QHS. He had not had a recurrence of facial seborrhea.  On 11/2/21, he said he was off Humira for 5-6 weeks due to receiving the COVID booster shot - he received the booster twice until he developed an immune response he stated. While off, he noted increased BM frequency, intermittent diarrhea (without blood), gas, and abdominal crampings. He had received 2 injections so far - now had 2 formed BMs QD. He was off it due to getting the COVID booster vaccine - actually received it a second time to see if there was an immune response. He continued on amitriptyline 10 mg 2 pills QHS and trazadone 0.5 pill QD.  On 7/6/22, he said he continued on Humira every 2 weeks - no diarrhea, bleeding, or abdominal pain. He continued on amitriptyline 10 mg 2 pills QHS.   He had started a new job and was on new insurance. He was told that Humira would cost $2500/month.  On 1/10/23, he said he continued on Humira [id number] - never forgot a dosage. His last dosage was on [facility]. He had daily mid-lower abdominal pain that resolved after having a BM. He continued on amitriptyline 10 mg 2 pills QHS.   On 9/22/23, the patient presented in follow-up after his Humira frequency was increased to weekly on Jan 28 due to a low adalimumab level. He was off Humira for a month due to a sinus infection, but had since resumed. At the time he resumed, he was having abdominal pain and diarrhea (5-6 BMs before lunch, soft not watery), but no rectal bleeding. He planned to stop Humira again, this time for around 2 weeks, because he planned to get the COVID booster, flu, and RSV vaccine. His last Humira dose was last week. He continued on amitriptyline 10 mg 2 pills QHS.   On 3/22/24, he said he continued on weekly Humira - no diarrhea, abdominal pain, or rectal bleeding. He never forgot a dose. Last Humira injection was 6 days ago. He continued on amitriptyline 10 mg 2 pills QHS. He was not taking Colesevelam.  HPI Today, he says he recently d/c Humira, so he could get the flu and COVID vaccines, but resumed 3 days ago. He was off Humira for 4 weeks. He is having increased BMs - 4-5x/day - and gas, but his BMs are formed. No abdominal pain. On Humira, his usual frequency was 2x/day. He continues on amitriptyline 10 mg 2 pills QHS.  Labs 9/22/23 - CBC, CMP all normal. Quant-TB negative. 1/13/23 - Adalimumab drug level 5.6/Ab <10. 7/6/22 - Quant-TB negative. CBC, CMP all normal. 11/2/21 - CBC, CMP all normal. 5/4/21 - CBC, CMP all normal. 12/2/20 - Adalimumab level 8.7 and antibody 74. 9/23/20 - Tb negative. CBC, CMP all normal.   3/29/19 - CBC, CMP, Lipids, B12, TSH, Vitamin D all normal. 2/27/19 - GI stool panel negative. CBC, CMP, and Vitamin B12 normal.  HCV Ab, HBsAG, and Quant Gold TB all negative. CRP 10.8, Hep B surface Ab reactive.  10/15/18 - CBC normal, CMP normal, 6-, 6-MMPN 2361 on 6-mp 100 mg daily. 6/28/18- CBC normal, CMP normal.  3/26/18- CBC normal, CMP normal, 6TG 84, 6MMPN 132 on 6-mp 50 mg daily.  12/22/17 - CBC/CMP normal. CRP 0.6.   9/28/17 CBC/CMP normal, CRP 52, sed rate 8.   9/2/17 Stool campylobacter/shiga toxin negative, C diff cx and toxin A/B negative, OandPx1 negative.  Stool salmonella/shigella pending. 9/6/17 [facility] - negative.   8/31/17 TPMT 23.7.   4/11/17 GI pathogen stool study negative, but does not test for Yersenia.   4/11/17 TSH/FT4 normal, CBC normal, CMP normal except for a sodium of 145.  ttg IgA negative, IgA level normal, sed rate 2/normal, CRP normal, lipase 47/normal.

## 2024-10-25 LAB
A/G RATIO: 1.9
ABSOLUTE BASOPHILS: 57
ABSOLUTE EOSINOPHILS: 82
ABSOLUTE LYMPHOCYTES: 3444
ABSOLUTE MONOCYTES: 820
ABSOLUTE NEUTROPHILS: 3797
ADALIMUMAB AB, IBD: <10
ADALIMUMAB LEVEL, IBD: 7.9
ALBUMIN: 4.4
ALKALINE PHOSPHATASE: 56
ALT (SGPT): 19
AST (SGOT): 18
BASOPHILS: 0.7
BILIRUBIN, TOTAL: 0.6
BUN/CREATININE RATIO: (no result)
BUN: 20
CALCIUM: 9.5
CARBON DIOXIDE, TOTAL: 30
CHLORIDE: 102
COMMENT: (no result)
CREATININE: 1.01
EGFR: 84
EOSINOPHILS: 1
GLOBULIN, TOTAL: 2.3
GLUCOSE: 82
HEMATOCRIT: 48.1
HEMOGLOBIN: 16.1
INTERPRETATION: (no result)
LYMPHOCYTES: 42
MCH: 31.3
MCHC: 33.5
MCV: 93.4
MONOCYTES: 10
MPV: 11.3
NEUTROPHILS: 46.3
PLATELET COUNT: 178
POTASSIUM: 4.3
PROTEIN, TOTAL: 6.7
RDW: 11.8
RED BLOOD CELL COUNT: 5.15
SODIUM: 141
WHITE BLOOD CELL COUNT: 8.2

## 2024-12-12 ENCOUNTER — OFFICE VISIT (OUTPATIENT)
Dept: URBAN - METROPOLITAN AREA SURGERY CENTER 16 | Facility: SURGERY CENTER | Age: 64
End: 2024-12-12

## 2025-01-09 ENCOUNTER — P2P PATIENT RECORD (OUTPATIENT)
Age: 65
End: 2025-01-09

## 2025-01-27 ENCOUNTER — TELEPHONE ENCOUNTER (OUTPATIENT)
Dept: URBAN - METROPOLITAN AREA CLINIC 105 | Facility: CLINIC | Age: 65
End: 2025-01-27

## 2025-01-27 RX ORDER — AMITRIPTYLINE HYDROCHLORIDE 10 MG/1
1 TABLET AT BEDTIME TABLET, FILM COATED ORAL ONCE A DAY
Qty: 90 TABLET | Refills: 3 | OUTPATIENT

## 2025-02-04 ENCOUNTER — ERX REFILL RESPONSE (OUTPATIENT)
Dept: URBAN - METROPOLITAN AREA CLINIC 105 | Facility: CLINIC | Age: 65
End: 2025-02-04

## 2025-02-04 RX ORDER — ADALIMUMAB 40MG/0.4ML
INJECT 1 PEN KIT SUBCUTANEOUS
Qty: 4 | Refills: 11 | OUTPATIENT

## 2025-02-10 ENCOUNTER — P2P PATIENT RECORD (OUTPATIENT)
Age: 65
End: 2025-02-10

## 2025-03-12 ENCOUNTER — OFFICE VISIT (OUTPATIENT)
Dept: URBAN - METROPOLITAN AREA CLINIC 105 | Facility: CLINIC | Age: 65
End: 2025-03-12

## 2025-03-12 RX ORDER — CETIRIZINE HYDROCHLORIDE 10 MG/1
TAKE 1 TABLET (10 MG) BY ORAL ROUTE ONCE DAILY TABLET, FILM COATED ORAL 1
Qty: 0 | Refills: 0 | COMMUNITY
Start: 1900-01-01

## 2025-03-12 RX ORDER — BUPROPION HCL 300 MG
TAKE 1 TABLET (300 MG) BY ORAL ROUTE ONCE DAILY TABLET, EXTENDED RELEASE 24 HR ORAL 1
Qty: 0 | Refills: 0 | COMMUNITY
Start: 1900-01-01

## 2025-03-12 RX ORDER — PROPRANOLOL HYDROCHLORIDE 10 MG/1
TAKE 1 TABLET (10 MG) BY ORAL ROUTE  ONCE PER DAY TABLET ORAL
Qty: 0 | Refills: 0 | COMMUNITY
Start: 1900-01-01

## 2025-03-12 RX ORDER — EZETIMIBE 10 MG/1
1 TABLET TABLET ORAL ONCE A DAY
COMMUNITY

## 2025-03-12 RX ORDER — ADALIMUMAB 40MG/0.4ML
INJECT 1 PEN KIT SUBCUTANEOUS
Qty: 4 | Refills: 11 | COMMUNITY

## 2025-03-12 RX ORDER — ROSUVASTATIN CALCIUM 40 MG/1
TAKE 1 TABLET (40 MG) BY ORAL ROUTE ONCE DAILY TABLET, FILM COATED ORAL 1
Qty: 0 | Refills: 0 | COMMUNITY
Start: 1900-01-01

## 2025-03-12 RX ORDER — FLUTICASONE PROPIONATE 50 UG/1
SPRAY 1 SPRAY (50 MCG) IN EACH NOSTRIL BY INTRANASAL ROUTE ONCE DAILY SPRAY, METERED NASAL 1
Qty: 1 | Refills: 0 | COMMUNITY
Start: 1900-01-01

## 2025-03-12 RX ORDER — LISINOPRIL AND HYDROCHLOROTHIAZIDE TABLETS 20; 25 MG/1; MG/1
TAKE 1/2  TABLET BY ORAL ROUTE ONCE DAILY TABLET ORAL 1
Qty: 0 | Refills: 0 | COMMUNITY
Start: 1900-01-01

## 2025-03-12 RX ORDER — AMITRIPTYLINE HYDROCHLORIDE 10 MG/1
1 TABLET AT BEDTIME TABLET, FILM COATED ORAL ONCE A DAY
Qty: 90 TABLET | Refills: 3 | COMMUNITY

## 2025-03-12 RX ORDER — FINASTERIDE 5 MG/1
TAKE 1 TABLET (5 MG) BY ORAL ROUTE ONCE DAILY TABLET, FILM COATED ORAL 1
Qty: 0 | Refills: 0 | COMMUNITY
Start: 1900-01-01

## 2025-03-12 RX ORDER — COLESEVELAM 625 MG/1
TAKE 1 TO 3 TABLETS BY MOUTH DAILY WITH LUNCH TABLET, FILM COATED ORAL
Qty: 90 TABLET | Refills: 12 | Status: ACTIVE | COMMUNITY

## 2025-03-12 RX ORDER — VALACYCLOVIR HCL 500 MG
TAKE 1 TABLET (500 MG) BY ORAL ROUTE ONCE DAILY TABLET ORAL 1
Qty: 0 | Refills: 0 | COMMUNITY
Start: 1900-01-01

## 2025-03-12 NOTE — HPI-OTHER HISTORIES
PAST MEDICAL HISTORY: The patient stated he had a trip to [place] in Jan. and Feb 2017. He had gotten a parasite in his previous trips but these symptoms were different. He was having loose, watery diarrhea. Some days he could have formed stool. He could go up to 8 BMs in the day. He would usually have 5 BMs daily most in the AM. One pill of Imodium provided relief for the rest of the day. Stress and eating could exacerbate symptoms. He noted he had been under a lot of work stress and wondered if this was IBS. The patient stated that hyoscyamine had provided 50% relief of his abdominal pain. He localized his LLQ pain over his previous surgical scar. He noted seeing a cosmetic surgeon because of the unevenness of the sides of the scar, but they differed any further management to his original surgeon.   Review of his [facility] chart noted an admit from 9/21/15 to 9/28/15 where he was noted to have a SB perforation. He had an exp lap with small bowel resection on 9/21/15. His later course was complicated by two intra-abdominal abscesses subsequently requiring IandD of peritoneal abscess with washout on 10/8/17. The cause of the small bowel perforation was not known. The patient had a colonoscopy on 4/28/17 which noted multiple ulcers in the TI and biopsies were significant for active ileitis. The patient had only taken Tylenol PRN since his resection. He denied NSAID use. He had a repeat colonoscopy on 8/11/17 which noted a few erosions in the TI consistent with ileal Crohn's; biopsies noted active enteritis with ulcer.   On visit 6/02/17, patient noted his bowel habit had improved and his abdominal pain was better after taking Cipro x 10 days. His ANCA/ASCA lab were negative for IBD. On visit 9/21/17, he stated he began having abdominal discomfort 1 x week and non-bloody diarrhea with a fever up to 101F on 9/18/17. He denied any upper GI symptoms with it. He was started on Cipro/Flagyl by Dr. Galvan. Then on visit 9/28/17 he stated his diarrhea had improved. He denied any watery diarrhea. He had 6 BMs before clinic visit with soft stool. He reported having abdominal pain on 9/26/17 with a temperature up to 100.8. He stated he woke up with a fever on 9/27/17 morning which resolved. On visit 10/10/17 he had finished his antibiotic treatment and stated that his BMs had improved with 4-5 BMs with formation but denied watery diarrhea. He also noted intermittent lower abdominal pain.   On visit 12/22/17 he stated he had no issues with diarrhea and denied abdominal pain. He had started the 6-MP 50 mg daily x 1 month and then decreased to 25 mg x 1 month because he had concerns that the 6-MP was increasing his susceptibility to having a cold. On 3/26/18, he stated he was doing well on 6-mp 50 mg daily. He denied significant diarrhea and abdominal pain.   On 6/28/18, the patient mentioned that last week his stomach felt sensitive and he experienced some cramping. He reported taking hyoscamine to help with the abdominal discomfort. He also reported his discomfort could be stress related due to stress from his job as an . The patient mentioned that he was doing well on the 6mp 50mg QD.  He stated he recently went to [place] work. During his travel, he experienced abdominal discomfort after eating the airplane food that was served. He also stated having one loose stool but no diarrhea after that. He was scheduled to return to [place] next week. The patient denied any other GI symptoms.   A colonoscopy on 8/27/18 revealed a single ulcer and focal active enteritis in the terminal ileum.   On 9/24/18, it was discussed with the patient about increasing his 6-mp due to some inflammation still present in the ileum while on 6-mp 50 mg QD. The patient noted having to very rarely use hyoscamine. He denied any other GI symptoms.   On 10/15/18, the patient noted he was doing well on 6- mg QD. He had been experiencing a rash on his face in the past month and wanted to know if it was related to the higher dose of 6-MP. He denied any other GI symptoms.   On 12/5/18, He noted no improvement in his rash on 6-mp 50 mg daily so he discontinued 6-mp. Off for 1-2 weeks he did not have a rash.  He resumed at 50 mg and had a recurrent facial rash.  He had seen his dermatologist when the rash had resolved and had a follow-up the next week after the rash had reoccured.  He also followed with an allergist, Dr. Ángel Chew and he noted he is "allergic to everything" and is on allergy shots. He denied any GI symptoms. He stated that he would use the hyoscyamine maybe once a year.   On 1/16/19, he reported that his facial rash had not returned since his last visit and he was doing well. He saw a dermatologist who had no further recomendation. He denied any GI symptoms.  On 2/27/19, he said that starting Friday he had bloating, diarrhea, loss of appetite, and abdominal pain and these symptoms were persisting. He traveled to [place] Wednesday and returned Friday night when he started feeling worse. He noted he had occasional nausea as well. He had not felt sick after previous trips to [place]. He stopped taking 6-MP because he got a rash. He said he had taken prednisone in the past and believed he tolerated it.   On 3/20/19, he said he was having 1-2 BMs/day with occasional watery diarrhea. On Monday and Tuesday he noted having nausea and mild periumbilical pain. He took hyoscyamine SL 0.125 mg PRN which provided relief. He took ondansetron on Monday and said it worked for a while, but had to take it again a few hours later. He took Cipro 500 mg for 7 days continues on prednisone 20 mg QD and said this regimen had helped. He was going to start on Humira injections on Friday.  On 4/10/19, he said he had started Humira and denied having a reaction to it. He noted decreased BM frequency with 4-5 soft/loose BMs/day. He denied abdominal pain and said he was eating ok. He noted taking prednisone 20 mg QD had affected his sleep and made him more irritable. He also noted that the rash on his face came back when he reduced the prednisone to 10 mg daily on his own.  It then resolved when he went back up to 20 mg daily.  On 5/1/19, he said he was doing well. He was still tapering off prednisone and was on 5 mg QD. He noted 2-3 soft, not watery BMs QD. He denied abdominal pain and his appetite was good. He had not needed any hyoscyamine. He noted his face rash was slightly noticeable.  On 8/6/19, he said his facial rash was better after using a topical medicine from his dermatologist. He was getting Humira every 2 weeks. He noted episodes every 3 weeks that would last for 3 days with 5-6 watery BMs/day, or some days he would have no BM. He was off prednisone. He still took trazodone for insomnia which helped him sleep.   On 11/12/19, he was taking Welchol 625 mg 1 pill QD. He took it at lunch or dinner. He noted a benefit. He had 2-3 BMs/day. They were formed 80% of the time.  He hadn't had any recent lab work with Dr. Escamilla. His physical was at the end of December. Recently, he had traveled to places like [place], [place], and [place] [facility].   On 9/23/20, he said he had a flare-up a couple weeks ago where he was having 6 BMs QAM. He was having frequent formed stools with some LLQ pain. He usually had 2-3 formed or soft BMs/day without blood. He continued on Humira every 2 weeks (injects into his thigh).  On 12/8/20, he said he was in the middle of a "flare-up." He had 4-5 medium formed BMs/day with lower abdominal pain and urgency. Pain resolved after a BM he stated.  He denied watery diarrhea and he denied rectal bleeding. He was on Colesevelam 2 pills/day with lunch 3-4x/week - which did help to form his BMs. He had his adalimumab+ab levels checked last week - lab pending. He took trazadone 100 mg (cuts 150 mg pill) for insomnia.  On 1/19/21, he said he had 1 formed BM/day without blood. He denied abdominal pain.  He had started on amitriptyline 10 mg QHS and was now off Welchol/colesevelam. He continued on trazadone 1/2 pill QD and was trying to find the right dosage of amitriptyline - either 10 or 20 mg at bedtime. His facial seborrhea had resolved.  On 5/4/21, he said he continued on Humira every 2 weeks. He continued to have 1 BM/day without blood. He only had intermittent minor lower abdominal pain at his incision site last week. Pain was now resolved. He had increased to amitriptyline 10 mg 2 pills QHS. He was sleeping well on this medication. He continued on trazadone 0.5 pill QHS. He had not had a recurrence of facial seborrhea.  On 11/2/21, he said he was off Humira for 5-6 weeks due to receiving the COVID booster shot - he received the booster twice until he developed an immune response he stated. While off, he noted increased BM frequency, intermittent diarrhea (without blood), gas, and abdominal crampings. He had received 2 injections so far - now had 2 formed BMs QD. He was off it due to getting the COVID booster vaccine - actually received it a second time to see if there was an immune response. He continued on amitriptyline 10 mg 2 pills QHS and trazadone 0.5 pill QD.  On 7/6/22, he said he continued on Humira every 2 weeks - no diarrhea, bleeding, or abdominal pain. He continued on amitriptyline 10 mg 2 pills QHS.   He had started a new job and was on new insurance. He was told that Humira would cost $2500/month.  On 1/10/23, he said he continued on Humira [id number] - never forgot a dosage. His last dosage was on [employer]. He had daily mid-lower abdominal pain that resolved after having a BM. He continued on amitriptyline 10 mg 2 pills QHS.   On 9/22/23, the patient presented in follow-up after his Humira frequency was increased to weekly on Jan 28 due to a low adalimumab level. He was off Humira for a month due to a sinus infection, but had since resumed. At the time he resumed, he was having abdominal pain and diarrhea (5-6 BMs before lunch, soft not watery), but no rectal bleeding. He planned to stop Humira again, this time for around 2 weeks, because he planned to get the COVID booster, flu, and RSV vaccine. His last Humira dose was last week. He continued on amitriptyline 10 mg 2 pills QHS.   On 3/22/24, he said he continued on weekly Humira - no diarrhea, abdominal pain, or rectal bleeding. He never forgot a dose. Last Humira injection was 6 days ago. He continued on amitriptyline 10 mg 2 pills QHS. He was not taking Colesevelam.  On 10/1/24, he said he recently d/c Humira, so he could get the flu and COVID vaccines, but resumed 3 days ago. He was off Humira for 4 weeks. He was having increased BMs - 4-5x/day - and gas, but his BMs were formed. No abdominal pain. On Humira, his usual frequency was 2x/day. He continued on amitriptyline 10 mg 2 pills QHS.  Labs 10/16/25 - Adalimumab level 7.9/Ab <10. CBC, CMP all normal. 9/22/23 - CBC, CMP all normal. Quant-TB negative. 1/13/23 - Adalimumab drug level 5.6/Ab <10. 7/6/22 - Quant-TB negative. CBC, CMP all normal. 11/2/21 - CBC, CMP all normal. 5/4/21 - CBC, CMP all normal. 12/2/20 - Adalimumab level 8.7 and antibody 74. 9/23/20 - Tb negative. CBC, CMP all normal.   3/29/19 - CBC, CMP, Lipids, B12, TSH, Vitamin D all normal. 2/27/19 - GI stool panel negative. CBC, CMP, and Vitamin B12 normal.  HCV Ab, HBsAG, and Quant Gold TB all negative. CRP 10.8, Hep B surface Ab reactive.  10/15/18 - CBC normal, CMP normal, 6-, 6-MMPN 2361 on 6-mp 100 mg daily. 6/28/18- CBC normal, CMP normal.  3/26/18- CBC normal, CMP normal, 6TG 84, 6MMPN 132 on 6-mp 50 mg daily.  12/22/17 - CBC/CMP normal. CRP 0.6.   9/28/17 CBC/CMP normal, CRP 52, sed rate 8.   9/2/17 Stool campylobacter/shiga toxin negative, C diff cx and toxin A/B negative, OandPx1 negative.  Stool salmonella/shigella pending. 9/6/17 [facility] - negative.   8/31/17 TPMT 23.7.   4/11/17 GI pathogen stool study negative, but does not test for Yersenia.   4/11/17 TSH/FT4 normal, CBC normal, CMP normal except for a sodium of 145.  ttg IgA negative, IgA level normal, sed rate 2/normal, CRP normal, lipase 47/normal.

## 2025-03-26 ENCOUNTER — OFFICE VISIT (OUTPATIENT)
Dept: URBAN - METROPOLITAN AREA CLINIC 105 | Facility: CLINIC | Age: 65
End: 2025-03-26
Payer: COMMERCIAL

## 2025-03-26 DIAGNOSIS — K58.9 IBS (IRRITABLE BOWEL SYNDROME): ICD-10-CM

## 2025-03-26 DIAGNOSIS — K50.00 CROHN'S DISEASE INVOLVING TERMINAL ILEUM: ICD-10-CM

## 2025-03-26 DIAGNOSIS — L21.9 SEBORRHEA: ICD-10-CM

## 2025-03-26 DIAGNOSIS — R19.7 DIARRHEA: ICD-10-CM

## 2025-03-26 PROCEDURE — 99214 OFFICE O/P EST MOD 30 MIN: CPT | Performed by: INTERNAL MEDICINE

## 2025-03-26 RX ORDER — AMITRIPTYLINE HYDROCHLORIDE 10 MG/1
2 TABLETS TABLET, FILM COATED ORAL
Qty: 180 | Refills: 3 | OUTPATIENT

## 2025-03-26 RX ORDER — COLESEVELAM 625 MG/1
TAKE 1 TO 3 TABLETS BY MOUTH DAILY WITH LUNCH TABLET, FILM COATED ORAL
Qty: 90 TABLET | Refills: 12 | Status: ACTIVE | COMMUNITY

## 2025-03-26 RX ORDER — FINASTERIDE 5 MG/1
TAKE 1 TABLET (5 MG) BY ORAL ROUTE ONCE DAILY TABLET, FILM COATED ORAL 1
Qty: 0 | Refills: 0 | Status: ACTIVE | COMMUNITY
Start: 1900-01-01

## 2025-03-26 RX ORDER — ADALIMUMAB 40MG/0.4ML
INJECT 1 PEN KIT SUBCUTANEOUS
Qty: 4 | Refills: 11 | Status: ACTIVE | COMMUNITY

## 2025-03-26 RX ORDER — AMITRIPTYLINE HYDROCHLORIDE 10 MG/1
1 TABLET AT BEDTIME TABLET, FILM COATED ORAL ONCE A DAY
Qty: 90 TABLET | Refills: 3 | Status: ACTIVE | COMMUNITY

## 2025-03-26 RX ORDER — CETIRIZINE HYDROCHLORIDE 10 MG/1
TAKE 1 TABLET (10 MG) BY ORAL ROUTE ONCE DAILY TABLET, FILM COATED ORAL 1
Qty: 0 | Refills: 0 | Status: ACTIVE | COMMUNITY
Start: 1900-01-01

## 2025-03-26 RX ORDER — FLUTICASONE PROPIONATE 50 UG/1
SPRAY 1 SPRAY (50 MCG) IN EACH NOSTRIL BY INTRANASAL ROUTE ONCE DAILY SPRAY, METERED NASAL 1
Qty: 1 | Refills: 0 | Status: ACTIVE | COMMUNITY
Start: 1900-01-01

## 2025-03-26 RX ORDER — BUPROPION HCL 300 MG
TAKE 1 TABLET (300 MG) BY ORAL ROUTE ONCE DAILY TABLET, EXTENDED RELEASE 24 HR ORAL 1
Qty: 0 | Refills: 0 | Status: ACTIVE | COMMUNITY
Start: 1900-01-01

## 2025-03-26 RX ORDER — PROPRANOLOL HYDROCHLORIDE 10 MG/1
TAKE 1 TABLET (10 MG) BY ORAL ROUTE  ONCE PER DAY TABLET ORAL
Qty: 0 | Refills: 0 | Status: ACTIVE | COMMUNITY
Start: 1900-01-01

## 2025-03-26 RX ORDER — EZETIMIBE 10 MG/1
1 TABLET TABLET ORAL ONCE A DAY
Status: ACTIVE | COMMUNITY

## 2025-03-26 RX ORDER — ROSUVASTATIN CALCIUM 40 MG/1
TAKE 1 TABLET (40 MG) BY ORAL ROUTE ONCE DAILY TABLET, FILM COATED ORAL 1
Qty: 0 | Refills: 0 | Status: ACTIVE | COMMUNITY
Start: 1900-01-01

## 2025-03-26 RX ORDER — VALACYCLOVIR HCL 500 MG
TAKE 1 TABLET (500 MG) BY ORAL ROUTE ONCE DAILY TABLET ORAL 1
Qty: 0 | Refills: 0 | Status: ACTIVE | COMMUNITY
Start: 1900-01-01

## 2025-03-26 RX ORDER — LISINOPRIL AND HYDROCHLOROTHIAZIDE TABLETS 20; 25 MG/1; MG/1
TAKE 1/2  TABLET BY ORAL ROUTE ONCE DAILY TABLET ORAL 1
Qty: 0 | Refills: 0 | Status: ACTIVE | COMMUNITY
Start: 1900-01-01

## 2025-03-26 NOTE — HPI-OTHER HISTORIES
PAST MEDICAL HISTORY: The patient stated he had a trip to [place] in Jan. and Feb 2017. He had gotten a parasite in his previous trips but these symptoms were different. He was having loose, watery diarrhea. Some days he could have formed stool. He could go up to 8 BMs in the day. He would usually have 5 BMs daily most in the AM. One pill of Imodium provided relief for the rest of the day. Stress and eating could exacerbate symptoms. He noted he had been under a lot of work stress and wondered if this was IBS. The patient stated that hyoscyamine had provided 50% relief of his abdominal pain. He localized his LLQ pain over his previous surgical scar. He noted seeing a cosmetic surgeon because of the unevenness of the sides of the scar, but they differed any further management to his original surgeon.   Review of his [facility] chart noted an admit from 9/21/15 to 9/28/15 where he was noted to have a SB perforation. He had an exp lap with small bowel resection on 9/21/15. His later course was complicated by two intra-abdominal abscesses subsequently requiring IandD of peritoneal abscess with washout on 10/8/17. The cause of the small bowel perforation was not known. The patient had a colonoscopy on 4/28/17 which noted multiple ulcers in the TI and biopsies were significant for active ileitis. The patient had only taken Tylenol PRN since his resection. He denied NSAID use. He had a repeat colonoscopy on 8/11/17 which noted a few erosions in the TI consistent with ileal Crohn's; biopsies noted active enteritis with ulcer.   On visit 6/02/17, patient noted his bowel habit had improved and his abdominal pain was better after taking Cipro x 10 days. His ANCA/ASCA lab were negative for IBD. On visit 9/21/17, he stated he began having abdominal discomfort 1 x week and non-bloody diarrhea with a fever up to 101F on 9/18/17. He denied any upper GI symptoms with it. He was started on Cipro/Flagyl by Dr. Galvan. Then on visit 9/28/17 he stated his diarrhea had improved. He denied any watery diarrhea. He had 6 BMs before clinic visit with soft stool. He reported having abdominal pain on 9/26/17 with a temperature up to 100.8. He stated he woke up with a fever on 9/27/17 morning which resolved. On visit 10/10/17 he had finished his antibiotic treatment and stated that his BMs had improved with 4-5 BMs with formation but denied watery diarrhea. He also noted intermittent lower abdominal pain.   On visit 12/22/17 he stated he had no issues with diarrhea and denied abdominal pain. He had started the 6-MP 50 mg daily x 1 month and then decreased to 25 mg x 1 month because he had concerns that the 6-MP was increasing his susceptibility to having a cold. On 3/26/18, he stated he was doing well on 6-mp 50 mg daily. He denied significant diarrhea and abdominal pain.   On 6/28/18, the patient mentioned that last week his stomach felt sensitive and he experienced some cramping. He reported taking hyoscamine to help with the abdominal discomfort. He also reported his discomfort could be stress related due to stress from his job as an . The patient mentioned that he was doing well on the 6mp 50mg QD.  He stated he recently went to [facility] for work. During his travel, he experienced abdominal discomfort after eating the airplane food that was served. He also stated having one loose stool but no diarrhea after that. He was scheduled to return to [facility] next week. The patient denied any other GI symptoms.   A colonoscopy on 8/27/18 revealed a single ulcer and focal active enteritis in the terminal ileum.   On 9/24/18, it was discussed with the patient about increasing his 6-mp due to some inflammation still present in the ileum while on 6-mp 50 mg QD. The patient noted having to very rarely use hyoscamine. He denied any other GI symptoms.   On 10/15/18, the patient noted he was doing well on 6- mg QD. He had been experiencing a rash on his face in the past month and wanted to know if it was related to the higher dose of 6-MP. He denied any other GI symptoms.   On 12/5/18, He noted no improvement in his rash on 6-mp 50 mg daily so he discontinued 6-mp. Off for 1-2 weeks he did not have a rash.  He resumed at 50 mg and had a recurrent facial rash.  He had seen his dermatologist when the rash had resolved and had a follow-up the next week after the rash had reoccured.  He also followed with an allergist, Dr. Ángel Chew and he noted he is "allergic to everything" and is on allergy shots. He denied any GI symptoms. He stated that he would use the hyoscyamine maybe once a year.   On 1/16/19, he reported that his facial rash had not returned since his last visit and he was doing well. He saw a dermatologist who had no further recomendation. He denied any GI symptoms.  On 2/27/19, he said that starting Friday he had bloating, diarrhea, loss of appetite, and abdominal pain and these symptoms were persisting. He traveled to [facility] Wednesday and returned Friday night when he started feeling worse. He noted he had occasional nausea as well. He had not felt sick after previous trips to [facility]. He stopped taking 6-MP because he got a rash. He said he had taken prednisone in the past and believed he tolerated it.   On 3/20/19, he said he was having 1-2 BMs/day with occasional watery diarrhea. On Monday and Tuesday he noted having nausea and mild periumbilical pain. He took hyoscyamine SL 0.125 mg PRN which provided relief. He took ondansetron on Monday and said it worked for a while, but had to take it again a few hours later. He took Cipro 500 mg for 7 days continues on prednisone 20 mg QD and said this regimen had helped. He was going to start on Humira injections on Friday.  On 4/10/19, he said he had started Humira and denied having a reaction to it. He noted decreased BM frequency with 4-5 soft/loose BMs/day. He denied abdominal pain and said he was eating ok. He noted taking prednisone 20 mg QD had affected his sleep and made him more irritable. He also noted that the rash on his face came back when he reduced the prednisone to 10 mg daily on his own.  It then resolved when he went back up to 20 mg daily.  On 5/1/19, he said he was doing well. He was still tapering off prednisone and was on 5 mg QD. He noted 2-3 soft, not watery BMs QD. He denied abdominal pain and his appetite was good. He had not needed any hyoscyamine. He noted his face rash was slightly noticeable.  On 8/6/19, he said his facial rash was better after using a topical medicine from his dermatologist. He was getting Humira every 2 weeks. He noted episodes every 3 weeks that would last for 3 days with 5-6 watery BMs/day, or some days he would have no BM. He was off prednisone. He still took trazodone for insomnia which helped him sleep.   On 11/12/19, he was taking Welchol 625 mg 1 pill QD. He took it at lunch or dinner. He noted a benefit. He had 2-3 BMs/day. They were formed 80% of the time.  He hadn't had any recent lab work with Dr. Escamilla. His physical was at the end of December. Recently, he had traveled to places like [place], [place], and the [facility].   On 9/23/20, he said he had a flare-up a couple weeks ago where he was having 6 BMs QAM. He was having frequent formed stools with some LLQ pain. He usually had 2-3 formed or soft BMs/day without blood. He continued on Humira every 2 weeks (injects into his thigh).  On 12/8/20, he said he was in the middle of a "flare-up." He had 4-5 medium formed BMs/day with lower abdominal pain and urgency. Pain resolved after a BM he stated.  He denied watery diarrhea and he denied rectal bleeding. He was on Colesevelam 2 pills/day with lunch 3-4x/week - which did help to form his BMs. He had his adalimumab+ab levels checked last week - lab pending. He took trazadone 100 mg (cuts 150 mg pill) for insomnia.  On 1/19/21, he said he had 1 formed BM/day without blood. He denied abdominal pain.  He had started on amitriptyline 10 mg QHS and was now off Welchol/colesevelam. He continued on trazadone 1/2 pill QD and was trying to find the right dosage of amitriptyline - either 10 or 20 mg at bedtime. His facial seborrhea had resolved.  On 5/4/21, he said he continued on Humira every 2 weeks. He continued to have 1 BM/day without blood. He only had intermittent minor lower abdominal pain at his incision site last week. Pain was now resolved. He had increased to amitriptyline 10 mg 2 pills QHS. He was sleeping well on this medication. He continued on trazadone 0.5 pill QHS. He had not had a recurrence of facial seborrhea.  On 11/2/21, he said he was off Humira for 5-6 weeks due to receiving the COVID booster shot - he received the booster twice until he developed an immune response he stated. While off, he noted increased BM frequency, intermittent diarrhea (without blood), gas, and abdominal crampings. He had received 2 injections so far - now had 2 formed BMs QD. He was off it due to getting the COVID booster vaccine - actually received it a second time to see if there was an immune response. He continued on amitriptyline 10 mg 2 pills QHS and trazadone 0.5 pill QD.  On 7/6/22, he said he continued on Humira every 2 weeks - no diarrhea, bleeding, or abdominal pain. He continued on amitriptyline 10 mg 2 pills QHS.   He had started a new job and was on new insurance. He was told that Humira would cost $2500/month.  On 1/10/23, he said he continued on Humira [id number] - never forgot a dosage. His last dosage was on [employer]. He had daily mid-lower abdominal pain that resolved after having a BM. He continued on amitriptyline 10 mg 2 pills QHS.   On 9/22/23, the patient presented in follow-up after his Humira frequency was increased to weekly on Jan 28 due to a low adalimumab level. He was off Humira for a month due to a sinus infection, but had since resumed. At the time he resumed, he was having abdominal pain and diarrhea (5-6 BMs before lunch, soft not watery), but no rectal bleeding. He planned to stop Humira again, this time for around 2 weeks, because he planned to get the COVID booster, flu, and RSV vaccine. His last Humira dose was last week. He continued on amitriptyline 10 mg 2 pills QHS.   On 3/22/24, he said he continued on weekly Humira - no diarrhea, abdominal pain, or rectal bleeding. He never forgot a dose. Last Humira injection was 6 days ago. He continued on amitriptyline 10 mg 2 pills QHS. He was not taking Colesevelam.  On 10/1/24, he said he recently d/c Humira, so he could get the flu and COVID vaccines, but resumed 3 days ago. He was off Humira for 4 weeks. He was having increased BMs - 4-5x/day - and gas, but his BMs were formed. No abdominal pain. On Humira, his usual frequency was 2x/day. He continued on amitriptyline 10 mg 2 pills QHS.  HPI  Today, he says he continues on weekly Humira, good about remembering to take it. No diarrhea, rectal bleeding, or abdominal pain. Last Humira injection was 3 days ago. He has a colon scheduled for May 12. He continues on amitriptyline 10 mg 2 pills QHS.  Labs 10/16/25 - Adalimumab level 7.9/Ab <10. CBC, CMP all normal. 9/22/23 - CBC, CMP all normal. Quant-TB negative. 1/13/23 - Adalimumab drug level 5.6/Ab <10. 7/6/22 - Quant-TB negative. CBC, CMP all normal. 11/2/21 - CBC, CMP all normal. 5/4/21 - CBC, CMP all normal. 12/2/20 - Adalimumab level 8.7 and antibody 74. 9/23/20 - Tb negative. CBC, CMP all normal.   3/29/19 - CBC, CMP, Lipids, B12, TSH, Vitamin D all normal. 2/27/19 - GI stool panel negative. CBC, CMP, and Vitamin B12 normal.  HCV Ab, HBsAG, and Quant Gold TB all negative. CRP 10.8, Hep B surface Ab reactive.  10/15/18 - CBC normal, CMP normal, 6-, 6-MMPN 2361 on 6-mp 100 mg daily. 6/28/18- CBC normal, CMP normal.  3/26/18- CBC normal, CMP normal, 6TG 84, 6MMPN 132 on 6-mp 50 mg daily.  12/22/17 - CBC/CMP normal. CRP 0.6.   9/28/17 CBC/CMP normal, CRP 52, sed rate 8.   9/2/17 Stool campylobacter/shiga toxin negative, C diff cx and toxin A/B negative, OandPx1 negative.  Stool salmonella/shigella pending. 9/6/17 [facility] - negative.   8/31/17 TPMT 23.7.   4/11/17 GI pathogen stool study negative, but does not test for Yersenia.   4/11/17 TSH/FT4 normal, CBC normal, CMP normal except for a sodium of 145.  ttg IgA negative, IgA level normal, sed rate 2/normal, CRP normal, lipase 47/normal.

## 2025-03-27 LAB
A/G RATIO: 1.5
ABSOLUTE BASOPHILS: 43
ABSOLUTE EOSINOPHILS: 79
ABSOLUTE LYMPHOCYTES: 3024
ABSOLUTE MONOCYTES: 727
ABSOLUTE NEUTROPHILS: 3326
ALBUMIN: 4.1
ALKALINE PHOSPHATASE: 61
ALT (SGPT): 17
AST (SGOT): 14
BASOPHILS: 0.6
BILIRUBIN, TOTAL: 0.5
BUN/CREATININE RATIO: (no result)
BUN: 18
C-REACTIVE PROTEIN, QUANT: <3
CALCIUM: 9.4
CARBON DIOXIDE, TOTAL: 26
CHLORIDE: 104
CREATININE: 0.89
EGFR: 96
EOSINOPHILS: 1.1
GLOBULIN, TOTAL: 2.7
GLUCOSE: 96
HEMATOCRIT: 47.1
HEMOGLOBIN: 15.9
LYMPHOCYTES: 42
MCH: 30.6
MCHC: 33.8
MCV: 90.6
MONOCYTES: 10.1
MPV: 11.8
NEUTROPHILS: 46.2
PLATELET COUNT: 231
POTASSIUM: 4.2
PROTEIN, TOTAL: 6.8
RDW: 12.5
RED BLOOD CELL COUNT: 5.2
SODIUM: 140
WHITE BLOOD CELL COUNT: 7.2

## 2025-05-12 ENCOUNTER — OFFICE VISIT (OUTPATIENT)
Dept: URBAN - METROPOLITAN AREA SURGERY CENTER 16 | Facility: SURGERY CENTER | Age: 65
End: 2025-05-12

## 2025-05-12 ENCOUNTER — CLAIMS CREATED FROM THE CLAIM WINDOW (OUTPATIENT)
Dept: URBAN - METROPOLITAN AREA CLINIC 4 | Facility: CLINIC | Age: 65
End: 2025-05-12
Payer: COMMERCIAL

## 2025-05-12 ENCOUNTER — CLAIMS CREATED FROM THE CLAIM WINDOW (OUTPATIENT)
Dept: URBAN - METROPOLITAN AREA SURGERY CENTER 16 | Facility: SURGERY CENTER | Age: 65
End: 2025-05-12
Payer: COMMERCIAL

## 2025-05-12 DIAGNOSIS — Z12.11 COLON CANCER SCREENING: ICD-10-CM

## 2025-05-12 DIAGNOSIS — K52.9 IBD (INFLAMMATORY BOWEL DISEASE): ICD-10-CM

## 2025-05-12 DIAGNOSIS — Z86.0100 HISTORY OF COLON POLYPS: ICD-10-CM

## 2025-05-12 DIAGNOSIS — K63.89 OTHER SPECIFIED DISEASES OF INTESTINE: ICD-10-CM

## 2025-05-12 DIAGNOSIS — I25.10 CAD (CORONARY ARTERY DISEASE): ICD-10-CM

## 2025-05-12 DIAGNOSIS — F41.9 ANXIETY: ICD-10-CM

## 2025-05-12 PROCEDURE — 00812 ANES LWR INTST SCR COLSC: CPT | Performed by: STUDENT IN AN ORGANIZED HEALTH CARE EDUCATION/TRAINING PROGRAM

## 2025-05-12 PROCEDURE — 88305 TISSUE EXAM BY PATHOLOGIST: CPT | Performed by: PATHOLOGY

## 2025-05-12 PROCEDURE — 00812 ANES LWR INTST SCR COLSC: CPT | Performed by: NURSE ANESTHETIST, CERTIFIED REGISTERED
